# Patient Record
Sex: FEMALE | Race: WHITE | Employment: FULL TIME | URBAN - METROPOLITAN AREA
[De-identification: names, ages, dates, MRNs, and addresses within clinical notes are randomized per-mention and may not be internally consistent; named-entity substitution may affect disease eponyms.]

---

## 2017-01-19 ENCOUNTER — OFFICE VISIT (OUTPATIENT)
Dept: INTERNAL MEDICINE | Age: 42
End: 2017-01-19

## 2017-01-19 VITALS
HEIGHT: 66 IN | DIASTOLIC BLOOD PRESSURE: 68 MMHG | RESPIRATION RATE: 16 BRPM | SYSTOLIC BLOOD PRESSURE: 112 MMHG | BODY MASS INDEX: 24.43 KG/M2 | HEART RATE: 90 BPM | WEIGHT: 152 LBS

## 2017-01-19 DIAGNOSIS — B00.1 HERPES SIMPLEX LABIALIS: Primary | ICD-10-CM

## 2017-01-19 PROCEDURE — 99212 OFFICE O/P EST SF 10 MIN: CPT | Performed by: PHYSICIAN ASSISTANT

## 2017-01-19 RX ORDER — VALACYCLOVIR HYDROCHLORIDE 1 G/1
2000 TABLET, FILM COATED ORAL 2 TIMES DAILY
Qty: 4 TABLET | Refills: 0 | Status: SHIPPED | OUTPATIENT
Start: 2017-01-19 | End: 2018-05-16 | Stop reason: SDUPTHER

## 2017-01-19 RX ORDER — ACYCLOVIR 400 MG/1
400 TABLET ORAL
COMMUNITY
End: 2017-04-13 | Stop reason: SDUPTHER

## 2017-01-19 ASSESSMENT — PATIENT HEALTH QUESTIONNAIRE - PHQ9
2. FEELING DOWN, DEPRESSED OR HOPELESS: 0
SUM OF ALL RESPONSES TO PHQ9 QUESTIONS 1 & 2: 0
SUM OF ALL RESPONSES TO PHQ QUESTIONS 1-9: 0
1. LITTLE INTEREST OR PLEASURE IN DOING THINGS: 0

## 2017-04-13 DIAGNOSIS — B00.1 HERPES SIMPLEX LABIALIS: ICD-10-CM

## 2017-04-13 RX ORDER — ACYCLOVIR 400 MG/1
400 TABLET ORAL
Qty: 25 TABLET | Refills: 3 | Status: SHIPPED | OUTPATIENT
Start: 2017-04-13 | End: 2017-04-18

## 2017-05-25 ENCOUNTER — TELEPHONE (OUTPATIENT)
Dept: INTERNAL MEDICINE | Age: 42
End: 2017-05-25

## 2017-05-25 ENCOUNTER — OFFICE VISIT (OUTPATIENT)
Dept: INTERNAL MEDICINE | Age: 42
End: 2017-05-25

## 2017-05-25 VITALS
SYSTOLIC BLOOD PRESSURE: 112 MMHG | RESPIRATION RATE: 14 BRPM | BODY MASS INDEX: 24.91 KG/M2 | HEART RATE: 88 BPM | WEIGHT: 155 LBS | DIASTOLIC BLOOD PRESSURE: 72 MMHG | HEIGHT: 66 IN

## 2017-05-25 DIAGNOSIS — M72.2 PLANTAR FASCIITIS, BILATERAL: Primary | ICD-10-CM

## 2017-05-25 DIAGNOSIS — M75.01 ADHESIVE CAPSULITIS OF RIGHT SHOULDER: ICD-10-CM

## 2017-05-25 PROCEDURE — 20610 DRAIN/INJ JOINT/BURSA W/O US: CPT | Performed by: PHYSICIAN ASSISTANT

## 2017-05-25 PROCEDURE — 99213 OFFICE O/P EST LOW 20 MIN: CPT | Performed by: PHYSICIAN ASSISTANT

## 2017-05-25 RX ORDER — LEVONORGESTREL AND ETHINYL ESTRADIOL 150-30(84)
KIT ORAL
Refills: 11 | COMMUNITY
Start: 2017-03-10 | End: 2018-02-21 | Stop reason: ALTCHOICE

## 2017-05-25 RX ORDER — TRIAMCINOLONE ACETONIDE 40 MG/ML
40 INJECTION, SUSPENSION INTRA-ARTICULAR; INTRAMUSCULAR ONCE
Status: COMPLETED | OUTPATIENT
Start: 2017-05-25 | End: 2017-05-25

## 2017-05-25 RX ADMIN — TRIAMCINOLONE ACETONIDE 40 MG: 40 INJECTION, SUSPENSION INTRA-ARTICULAR; INTRAMUSCULAR at 08:48

## 2017-05-25 ASSESSMENT — ENCOUNTER SYMPTOMS: BACK PAIN: 0

## 2017-07-27 ENCOUNTER — OFFICE VISIT (OUTPATIENT)
Dept: INTERNAL MEDICINE | Age: 42
End: 2017-07-27

## 2017-07-27 VITALS
WEIGHT: 147 LBS | HEART RATE: 86 BPM | BODY MASS INDEX: 23.63 KG/M2 | SYSTOLIC BLOOD PRESSURE: 104 MMHG | HEIGHT: 66 IN | RESPIRATION RATE: 14 BRPM | DIASTOLIC BLOOD PRESSURE: 68 MMHG

## 2017-07-27 DIAGNOSIS — L23.7 POISON IVY DERMATITIS: Primary | ICD-10-CM

## 2017-07-27 PROCEDURE — 96372 THER/PROPH/DIAG INJ SC/IM: CPT | Performed by: PHYSICIAN ASSISTANT

## 2017-07-27 PROCEDURE — 99212 OFFICE O/P EST SF 10 MIN: CPT | Performed by: PHYSICIAN ASSISTANT

## 2017-07-27 RX ORDER — METHYLPREDNISOLONE ACETATE 80 MG/ML
80 INJECTION, SUSPENSION INTRA-ARTICULAR; INTRALESIONAL; INTRAMUSCULAR; SOFT TISSUE ONCE
Status: COMPLETED | OUTPATIENT
Start: 2017-07-27 | End: 2017-07-27

## 2017-07-27 RX ORDER — METHYLPREDNISOLONE 4 MG/1
TABLET ORAL
Qty: 1 KIT | Refills: 0 | Status: SHIPPED | OUTPATIENT
Start: 2017-07-27 | End: 2017-08-02

## 2017-07-27 RX ADMIN — METHYLPREDNISOLONE ACETATE 80 MG: 80 INJECTION, SUSPENSION INTRA-ARTICULAR; INTRALESIONAL; INTRAMUSCULAR; SOFT TISSUE at 15:55

## 2017-07-27 ASSESSMENT — ENCOUNTER SYMPTOMS
SHORTNESS OF BREATH: 0
SORE THROAT: 0
COUGH: 0

## 2017-08-04 ENCOUNTER — TELEPHONE (OUTPATIENT)
Dept: INTERNAL MEDICINE | Age: 42
End: 2017-08-04

## 2017-08-07 ENCOUNTER — OFFICE VISIT (OUTPATIENT)
Dept: INTERNAL MEDICINE | Age: 42
End: 2017-08-07

## 2017-08-07 VITALS
RESPIRATION RATE: 16 BRPM | DIASTOLIC BLOOD PRESSURE: 78 MMHG | BODY MASS INDEX: 23.95 KG/M2 | HEIGHT: 66 IN | WEIGHT: 149 LBS | HEART RATE: 98 BPM | SYSTOLIC BLOOD PRESSURE: 112 MMHG

## 2017-08-07 DIAGNOSIS — L23.7 POISON IVY DERMATITIS: Primary | ICD-10-CM

## 2017-08-07 PROCEDURE — 99212 OFFICE O/P EST SF 10 MIN: CPT | Performed by: PHYSICIAN ASSISTANT

## 2017-08-07 RX ORDER — CLOBETASOL PROPIONATE 0.5 MG/G
CREAM TOPICAL
Qty: 1 TUBE | Refills: 0 | Status: SHIPPED | OUTPATIENT
Start: 2017-08-07 | End: 2018-02-21 | Stop reason: ALTCHOICE

## 2017-08-16 ENCOUNTER — OFFICE VISIT (OUTPATIENT)
Dept: FAMILY MEDICINE CLINIC | Age: 42
End: 2017-08-16

## 2017-08-16 VITALS
HEIGHT: 64 IN | HEART RATE: 94 BPM | BODY MASS INDEX: 25.61 KG/M2 | WEIGHT: 150 LBS | RESPIRATION RATE: 16 BRPM | SYSTOLIC BLOOD PRESSURE: 110 MMHG | DIASTOLIC BLOOD PRESSURE: 70 MMHG

## 2017-08-16 DIAGNOSIS — M43.6 TORTICOLLIS, ACQUIRED: Primary | ICD-10-CM

## 2017-08-16 DIAGNOSIS — R10.13 EPIGASTRIC PAIN: ICD-10-CM

## 2017-08-16 PROCEDURE — 99213 OFFICE O/P EST LOW 20 MIN: CPT | Performed by: PHYSICIAN ASSISTANT

## 2017-08-16 RX ORDER — OMEPRAZOLE 20 MG/1
20 CAPSULE, DELAYED RELEASE ORAL 2 TIMES DAILY
Qty: 30 CAPSULE | Refills: 3 | Status: SHIPPED | OUTPATIENT
Start: 2017-08-16 | End: 2017-08-22

## 2017-08-16 RX ORDER — NAPROXEN 500 MG/1
500 TABLET ORAL 2 TIMES DAILY WITH MEALS
Qty: 60 TABLET | Refills: 0 | Status: SHIPPED | OUTPATIENT
Start: 2017-08-16 | End: 2017-08-22

## 2017-08-16 RX ORDER — CYCLOBENZAPRINE HCL 5 MG
5 TABLET ORAL 3 TIMES DAILY PRN
Qty: 30 TABLET | Refills: 0 | Status: SHIPPED | OUTPATIENT
Start: 2017-08-16 | End: 2017-08-22

## 2017-08-22 ENCOUNTER — OFFICE VISIT (OUTPATIENT)
Dept: INTERNAL MEDICINE | Age: 42
End: 2017-08-22

## 2017-08-22 VITALS
HEART RATE: 81 BPM | BODY MASS INDEX: 24.43 KG/M2 | SYSTOLIC BLOOD PRESSURE: 110 MMHG | DIASTOLIC BLOOD PRESSURE: 80 MMHG | WEIGHT: 152 LBS | HEIGHT: 66 IN

## 2017-08-22 DIAGNOSIS — T78.40XA ALLERGIC REACTION, INITIAL ENCOUNTER: Primary | ICD-10-CM

## 2017-08-22 PROCEDURE — 99213 OFFICE O/P EST LOW 20 MIN: CPT | Performed by: FAMILY MEDICINE

## 2017-08-22 PROCEDURE — 96372 THER/PROPH/DIAG INJ SC/IM: CPT | Performed by: FAMILY MEDICINE

## 2017-08-22 RX ORDER — FEXOFENADINE HCL 180 MG/1
180 TABLET ORAL DAILY
Qty: 10 TABLET | Refills: 0 | Status: SHIPPED | OUTPATIENT
Start: 2017-08-22 | End: 2017-09-01

## 2017-08-22 RX ORDER — DIPHENHYDRAMINE HCL 25 MG
25 CAPSULE ORAL NIGHTLY
Qty: 10 CAPSULE | Refills: 0 | Status: SHIPPED | OUTPATIENT
Start: 2017-08-22 | End: 2017-09-01

## 2017-08-22 RX ORDER — PREDNISONE 20 MG/1
TABLET ORAL
Qty: 24 TABLET | Refills: 0 | Status: SHIPPED | OUTPATIENT
Start: 2017-08-22 | End: 2017-10-23 | Stop reason: ALTCHOICE

## 2017-08-22 RX ORDER — METHYLPREDNISOLONE ACETATE 80 MG/ML
80 INJECTION, SUSPENSION INTRA-ARTICULAR; INTRALESIONAL; INTRAMUSCULAR; SOFT TISSUE ONCE
Status: COMPLETED | OUTPATIENT
Start: 2017-08-22 | End: 2017-08-22

## 2017-08-22 RX ADMIN — METHYLPREDNISOLONE ACETATE 80 MG: 80 INJECTION, SUSPENSION INTRA-ARTICULAR; INTRALESIONAL; INTRAMUSCULAR; SOFT TISSUE at 13:39

## 2017-08-28 ENCOUNTER — TELEPHONE (OUTPATIENT)
Dept: INTERNAL MEDICINE | Age: 42
End: 2017-08-28

## 2017-09-11 ENCOUNTER — OFFICE VISIT (OUTPATIENT)
Dept: INTERNAL MEDICINE | Age: 42
End: 2017-09-11

## 2017-09-11 VITALS
RESPIRATION RATE: 14 BRPM | HEART RATE: 98 BPM | SYSTOLIC BLOOD PRESSURE: 110 MMHG | WEIGHT: 147 LBS | HEIGHT: 66 IN | BODY MASS INDEX: 23.63 KG/M2 | DIASTOLIC BLOOD PRESSURE: 72 MMHG

## 2017-09-11 DIAGNOSIS — R10.13 EPIGASTRIC ABDOMINAL PAIN: Primary | ICD-10-CM

## 2017-09-11 DIAGNOSIS — M79.662 BILATERAL CALF PAIN: ICD-10-CM

## 2017-09-11 DIAGNOSIS — M79.661 BILATERAL CALF PAIN: ICD-10-CM

## 2017-09-11 PROCEDURE — 99213 OFFICE O/P EST LOW 20 MIN: CPT | Performed by: PHYSICIAN ASSISTANT

## 2017-09-11 ASSESSMENT — ENCOUNTER SYMPTOMS
BLOOD IN STOOL: 0
ABDOMINAL PAIN: 1
NAUSEA: 1
VOMITING: 1
CONSTIPATION: 0

## 2017-09-30 ENCOUNTER — HOSPITAL ENCOUNTER (OUTPATIENT)
Dept: ULTRASOUND IMAGING | Age: 42
Discharge: HOME | End: 2017-09-30
Payer: COMMERCIAL

## 2017-09-30 DIAGNOSIS — R10.13 EPIGASTRIC ABDOMINAL PAIN: ICD-10-CM

## 2017-09-30 PROCEDURE — 76705 ECHO EXAM OF ABDOMEN: CPT

## 2017-10-23 ENCOUNTER — OFFICE VISIT (OUTPATIENT)
Dept: INTERNAL MEDICINE | Age: 42
End: 2017-10-23

## 2017-10-23 VITALS
OXYGEN SATURATION: 99 % | WEIGHT: 146.2 LBS | BODY MASS INDEX: 23.6 KG/M2 | SYSTOLIC BLOOD PRESSURE: 112 MMHG | DIASTOLIC BLOOD PRESSURE: 70 MMHG | HEART RATE: 77 BPM

## 2017-10-23 DIAGNOSIS — Z23 NEED FOR INFLUENZA VACCINATION: ICD-10-CM

## 2017-10-23 DIAGNOSIS — R10.13 EPIGASTRIC ABDOMINAL PAIN: Primary | ICD-10-CM

## 2017-10-23 PROCEDURE — 90471 IMMUNIZATION ADMIN: CPT | Performed by: PHYSICIAN ASSISTANT

## 2017-10-23 PROCEDURE — 90688 IIV4 VACCINE SPLT 0.5 ML IM: CPT | Performed by: PHYSICIAN ASSISTANT

## 2017-10-23 PROCEDURE — 99213 OFFICE O/P EST LOW 20 MIN: CPT | Performed by: PHYSICIAN ASSISTANT

## 2017-10-23 RX ORDER — NORETHINDRONE-ETHIN. ESTRADIOL 1 MG-35MCG
TABLET ORAL
Refills: 1 | COMMUNITY
Start: 2017-09-06 | End: 2018-02-21 | Stop reason: ALTCHOICE

## 2017-10-23 RX ORDER — PREDNISONE 20 MG/1
TABLET ORAL
Refills: 0 | COMMUNITY
Start: 2017-08-22 | End: 2018-02-21 | Stop reason: ALTCHOICE

## 2017-10-23 ASSESSMENT — ENCOUNTER SYMPTOMS
VOMITING: 1
DIARRHEA: 0
CONSTIPATION: 0
COUGH: 0
ABDOMINAL PAIN: 1
NAUSEA: 1

## 2018-01-12 LAB
CHOLESTEROL, TOTAL: 208 MG/DL
CHOLESTEROL/HDL RATIO: 5.1
GLUCOSE BLD-MCNC: 89 MG/DL
HDLC SERPL-MCNC: 41 MG/DL (ref 35–70)
LDL CHOLESTEROL CALCULATED: 139 MG/DL (ref 0–160)
TRIGL SERPL-MCNC: 152 MG/DL
VLDLC SERPL CALC-MCNC: NORMAL MG/DL

## 2018-01-30 ENCOUNTER — OFFICE VISIT (OUTPATIENT)
Dept: FAMILY MEDICINE CLINIC | Age: 43
End: 2018-01-30
Payer: COMMERCIAL

## 2018-01-30 VITALS
HEART RATE: 94 BPM | HEIGHT: 66 IN | DIASTOLIC BLOOD PRESSURE: 72 MMHG | WEIGHT: 156 LBS | SYSTOLIC BLOOD PRESSURE: 126 MMHG | BODY MASS INDEX: 25.07 KG/M2 | RESPIRATION RATE: 16 BRPM

## 2018-01-30 DIAGNOSIS — Z00.00 ANNUAL PHYSICAL EXAM: Primary | ICD-10-CM

## 2018-01-30 DIAGNOSIS — Z01.89 ROUTINE LAB DRAW: ICD-10-CM

## 2018-01-30 DIAGNOSIS — E78.5 DYSLIPIDEMIA: ICD-10-CM

## 2018-01-30 PROCEDURE — 99396 PREV VISIT EST AGE 40-64: CPT | Performed by: PHYSICIAN ASSISTANT

## 2018-01-30 RX ORDER — ATORVASTATIN CALCIUM 20 MG/1
20 TABLET, FILM COATED ORAL DAILY
Qty: 90 TABLET | Refills: 3 | Status: SHIPPED | OUTPATIENT
Start: 2018-01-30 | End: 2019-01-31 | Stop reason: SDUPTHER

## 2018-01-30 RX ORDER — CYCLOBENZAPRINE HCL 5 MG
5 TABLET ORAL 3 TIMES DAILY PRN
COMMUNITY
End: 2018-02-21 | Stop reason: SDUPTHER

## 2018-01-30 RX ORDER — NAPROXEN 500 MG/1
500 TABLET ORAL 2 TIMES DAILY WITH MEALS
COMMUNITY
End: 2020-01-28 | Stop reason: ALTCHOICE

## 2018-01-30 ASSESSMENT — ENCOUNTER SYMPTOMS
SHORTNESS OF BREATH: 0
ABDOMINAL PAIN: 0
COUGH: 0

## 2018-01-30 NOTE — PROGRESS NOTES
SUBJECTIVE  Lupe Velasquez, 43 y.o. female presents today with:  Chief Complaint   Patient presents with   [address]     Had health screening at work, Cholesterol was elevated      PCP:  ARIEL Morales      HPI    Lupe Velasquez is here for annual wellness visit, for insurance,  She does not have any complaints today  She does not exercise regularly  She does not watch the diet. Recent labs were reviewed  Recent labs at work showed dyslipidemia. And she needs them controlled for insurance   Patient's last menstrual period was 11/29/2017. takes oral birth control in order to have no little or no periods      History reviewed. No pertinent past medical history. Past Surgical History:   Procedure Laterality Date    TUBAL LIGATION  2005    UPPER GASTROINTESTINAL ENDOSCOPY  12/01/2017    DR SINGH     Social History     Social History    Marital status:      Spouse name: N/A    Number of children: N/A    Years of education: N/A     Occupational History    Not on file. Social History Main Topics    Smoking status: Never Smoker    Smokeless tobacco: Never Used    Alcohol use No    Drug use: No    Sexual activity: Not on file     Other Topics Concern    Not on file     Social History Narrative    No narrative on file     Family History   Problem Relation Age of Onset    High Cholesterol Mother     High Blood Pressure Mother     Diabetes Maternal Grandmother     Heart Disease Maternal Grandmother     Heart Disease Maternal Grandfather        Review of Systems   Constitutional: Negative for chills and fever. HENT: Negative for congestion. Respiratory: Negative for cough and shortness of breath. Cardiovascular: Negative for chest pain, palpitations and leg swelling. Gastrointestinal: Negative for abdominal pain. Genitourinary: Negative for dysuria. I have reviewed the patient's medical history in detail and updated the computerized patient record.       OBJECTIVE    Vitals: 01/30/18 0804   BP: 126/72   Site: Right Arm   Position: Sitting   Cuff Size: Medium Adult   Pulse: 94   Resp: 16   Weight: 156 lb (70.8 kg)   Height: 5' 6\" (1.676 m)       Physical Exam   Constitutional: She is well-developed, well-nourished, and in no distress. HENT:   Head: Normocephalic. Mouth/Throat: Oropharynx is clear and moist.   Eyes: Conjunctivae are normal.   Neck: Normal range of motion. Neck supple. Cardiovascular: Normal rate, regular rhythm and normal heart sounds. Pulmonary/Chest: Effort normal and breath sounds normal.   Lymphadenopathy:     She has no cervical adenopathy. ASSESSMENT/ PLAN    1. Annual physical exam  - discussed health maintenence  - pt states she has appt for PAP in Feb 2. Dyslipidemia  -  Patient was advised to Start  Lipitor   - The following changes are planned for the next 6 months, at which time the patient will return for repeat fasting lipids:  Dietary changes: Reduce saturated fat, \"trans\" monounsaturated fatty acids, and  cholesterol. Increase soluble fiber. Exercise changes:  Advised to engage in walking   3-4 times a week. - atorvastatin (LIPITOR) 20 MG tablet; Take 1 tablet by mouth daily  Dispense: 90 tablet; Refill: 3    3.  Routine lab draw  - Lipid Panel  - Glucose                Electronically signed by:  ARIEL Melchor   1/30/18

## 2018-02-21 ENCOUNTER — HOSPITAL ENCOUNTER (OUTPATIENT)
Age: 43
Discharge: HOME | End: 2018-02-23
Payer: COMMERCIAL

## 2018-02-21 ENCOUNTER — OFFICE VISIT (OUTPATIENT)
Dept: FAMILY MEDICINE CLINIC | Age: 43
End: 2018-02-21
Payer: COMMERCIAL

## 2018-02-21 ENCOUNTER — HOSPITAL ENCOUNTER (OUTPATIENT)
Dept: GENERAL RADIOLOGY | Age: 43
Discharge: HOME | End: 2018-02-23
Payer: COMMERCIAL

## 2018-02-21 VITALS
BODY MASS INDEX: 25.47 KG/M2 | OXYGEN SATURATION: 98 % | SYSTOLIC BLOOD PRESSURE: 116 MMHG | TEMPERATURE: 98.1 F | DIASTOLIC BLOOD PRESSURE: 72 MMHG | WEIGHT: 157.8 LBS | HEART RATE: 87 BPM

## 2018-02-21 DIAGNOSIS — M54.50 CHRONIC MIDLINE LOW BACK PAIN WITHOUT SCIATICA: ICD-10-CM

## 2018-02-21 DIAGNOSIS — G89.29 CHRONIC MIDLINE LOW BACK PAIN WITHOUT SCIATICA: Primary | ICD-10-CM

## 2018-02-21 DIAGNOSIS — M54.50 CHRONIC MIDLINE LOW BACK PAIN WITHOUT SCIATICA: Primary | ICD-10-CM

## 2018-02-21 DIAGNOSIS — G89.29 CHRONIC MIDLINE LOW BACK PAIN WITHOUT SCIATICA: ICD-10-CM

## 2018-02-21 DIAGNOSIS — M54.2 CERVICALGIA: ICD-10-CM

## 2018-02-21 PROCEDURE — 72170 X-RAY EXAM OF PELVIS: CPT

## 2018-02-21 PROCEDURE — 99213 OFFICE O/P EST LOW 20 MIN: CPT | Performed by: PHYSICIAN ASSISTANT

## 2018-02-21 PROCEDURE — 72040 X-RAY EXAM NECK SPINE 2-3 VW: CPT

## 2018-02-21 RX ORDER — CYCLOBENZAPRINE HCL 10 MG
10 TABLET ORAL 3 TIMES DAILY PRN
Qty: 30 TABLET | Refills: 0 | Status: SHIPPED | OUTPATIENT
Start: 2018-02-21 | End: 2018-03-03

## 2018-02-21 RX ORDER — METHYLPREDNISOLONE 4 MG/1
TABLET ORAL
Qty: 1 KIT | Refills: 0 | Status: SHIPPED | OUTPATIENT
Start: 2018-02-21 | End: 2018-02-27

## 2018-02-21 ASSESSMENT — PATIENT HEALTH QUESTIONNAIRE - PHQ9
SUM OF ALL RESPONSES TO PHQ QUESTIONS 1-9: 0
2. FEELING DOWN, DEPRESSED OR HOPELESS: 0
SUM OF ALL RESPONSES TO PHQ9 QUESTIONS 1 & 2: 0
1. LITTLE INTEREST OR PLEASURE IN DOING THINGS: 0

## 2018-02-21 ASSESSMENT — ENCOUNTER SYMPTOMS: COLOR CHANGE: 0

## 2018-02-21 NOTE — PROGRESS NOTES
(1-2 VIEWS); Future  - cyclobenzaprine (FLEXERIL) 10 MG tablet; Take 1 tablet by mouth 3 times daily as needed for Muscle spasms  Dispense: 30 tablet; Refill: 0  - methylPREDNISolone (MEDROL DOSEPACK) 4 MG tablet; Take by mouth. Dispense: 1 kit; Refill: 0    2. Cervicalgia  - XR CERVICAL SPINE (2-3 VIEWS); Future  - cyclobenzaprine (FLEXERIL) 10 MG tablet; Take 1 tablet by mouth 3 times daily as needed for Muscle spasms  Dispense: 30 tablet; Refill: 0  - methylPREDNISolone (MEDROL DOSEPACK) 4 MG tablet; Take by mouth. Dispense: 1 kit;  Refill: 0                Electronically signed by:  ARIEL Fletcher   2/21/18

## 2018-02-22 DIAGNOSIS — M25.859 FEMORAL ACETABULAR IMPINGEMENT: Primary | ICD-10-CM

## 2018-03-13 ENCOUNTER — HOSPITAL ENCOUNTER (OUTPATIENT)
Dept: ORTHOPEDIC SURGERY | Age: 43
Discharge: HOME | End: 2018-03-15
Payer: COMMERCIAL

## 2018-03-13 DIAGNOSIS — M25.561 ARTHRALGIA OF BOTH LOWER LEGS: ICD-10-CM

## 2018-03-13 DIAGNOSIS — M25.562 ARTHRALGIA OF BOTH LOWER LEGS: ICD-10-CM

## 2018-03-13 PROCEDURE — 73521 X-RAY EXAM HIPS BI 2 VIEWS: CPT

## 2018-05-07 ENCOUNTER — OFFICE VISIT (OUTPATIENT)
Dept: INTERNAL MEDICINE | Age: 43
End: 2018-05-07
Payer: COMMERCIAL

## 2018-05-07 VITALS
HEIGHT: 66 IN | DIASTOLIC BLOOD PRESSURE: 80 MMHG | HEART RATE: 90 BPM | WEIGHT: 158 LBS | BODY MASS INDEX: 25.39 KG/M2 | SYSTOLIC BLOOD PRESSURE: 120 MMHG

## 2018-05-07 DIAGNOSIS — T75.3XXA MOTION SICKNESS, INITIAL ENCOUNTER: Primary | ICD-10-CM

## 2018-05-07 PROCEDURE — 99212 OFFICE O/P EST SF 10 MIN: CPT | Performed by: PHYSICIAN ASSISTANT

## 2018-05-07 RX ORDER — SCOLOPAMINE TRANSDERMAL SYSTEM 1 MG/1
1 PATCH, EXTENDED RELEASE TRANSDERMAL
Qty: 4 PATCH | Refills: 0 | Status: SHIPPED | OUTPATIENT
Start: 2018-05-07 | End: 2019-05-07

## 2018-05-07 ASSESSMENT — ENCOUNTER SYMPTOMS
SHORTNESS OF BREATH: 0
COUGH: 0
ABDOMINAL PAIN: 0

## 2018-05-16 ENCOUNTER — TELEPHONE (OUTPATIENT)
Dept: INTERNAL MEDICINE | Age: 43
End: 2018-05-16

## 2018-05-16 DIAGNOSIS — B00.1 HERPES SIMPLEX LABIALIS: ICD-10-CM

## 2018-05-16 RX ORDER — VALACYCLOVIR HYDROCHLORIDE 1 G/1
2000 TABLET, FILM COATED ORAL 2 TIMES DAILY
Qty: 4 TABLET | Refills: 0 | Status: SHIPPED | OUTPATIENT
Start: 2018-05-16 | End: 2019-09-09 | Stop reason: SDUPTHER

## 2018-06-04 ENCOUNTER — OFFICE VISIT (OUTPATIENT)
Dept: INTERNAL MEDICINE | Age: 43
End: 2018-06-04
Payer: COMMERCIAL

## 2018-06-04 ENCOUNTER — TELEPHONE (OUTPATIENT)
Dept: INTERNAL MEDICINE | Age: 43
End: 2018-06-04

## 2018-06-04 VITALS
HEART RATE: 90 BPM | BODY MASS INDEX: 24.78 KG/M2 | DIASTOLIC BLOOD PRESSURE: 70 MMHG | OXYGEN SATURATION: 98 % | WEIGHT: 153.5 LBS | SYSTOLIC BLOOD PRESSURE: 130 MMHG

## 2018-06-04 DIAGNOSIS — L23.7 POISON IVY DERMATITIS: Primary | ICD-10-CM

## 2018-06-04 PROCEDURE — 99213 OFFICE O/P EST LOW 20 MIN: CPT | Performed by: PHYSICIAN ASSISTANT

## 2018-06-04 PROCEDURE — 96372 THER/PROPH/DIAG INJ SC/IM: CPT | Performed by: PHYSICIAN ASSISTANT

## 2018-06-04 RX ORDER — TRIAMCINOLONE ACETONIDE 0.25 MG/G
OINTMENT TOPICAL
Qty: 1 TUBE | Refills: 0 | Status: SHIPPED | OUTPATIENT
Start: 2018-06-04 | End: 2018-06-11

## 2018-06-04 RX ORDER — METHYLPREDNISOLONE ACETATE 80 MG/ML
80 INJECTION, SUSPENSION INTRA-ARTICULAR; INTRALESIONAL; INTRAMUSCULAR; SOFT TISSUE ONCE
Status: COMPLETED | OUTPATIENT
Start: 2018-06-04 | End: 2018-06-04

## 2018-06-04 RX ORDER — METHYLPREDNISOLONE 4 MG/1
TABLET ORAL
Qty: 1 KIT | Refills: 0 | Status: SHIPPED | OUTPATIENT
Start: 2018-06-04 | End: 2018-06-10

## 2018-06-04 RX ORDER — CLOBETASOL PROPIONATE 0.5 MG/G
CREAM TOPICAL
Qty: 1 TUBE | Refills: 0 | Status: SHIPPED | OUTPATIENT
Start: 2018-06-04 | End: 2018-10-22

## 2018-06-04 RX ADMIN — METHYLPREDNISOLONE ACETATE 80 MG: 80 INJECTION, SUSPENSION INTRA-ARTICULAR; INTRALESIONAL; INTRAMUSCULAR; SOFT TISSUE at 08:56

## 2018-06-04 ASSESSMENT — ENCOUNTER SYMPTOMS
COUGH: 0
SHORTNESS OF BREATH: 0

## 2018-10-22 ENCOUNTER — OFFICE VISIT (OUTPATIENT)
Dept: INTERNAL MEDICINE | Age: 43
End: 2018-10-22
Payer: COMMERCIAL

## 2018-10-22 ENCOUNTER — HOSPITAL ENCOUNTER (OUTPATIENT)
Age: 43
Discharge: HOME | End: 2018-10-24
Payer: COMMERCIAL

## 2018-10-22 ENCOUNTER — HOSPITAL ENCOUNTER (OUTPATIENT)
Dept: GENERAL RADIOLOGY | Age: 43
Discharge: HOME | End: 2018-10-24
Payer: COMMERCIAL

## 2018-10-22 VITALS
DIASTOLIC BLOOD PRESSURE: 80 MMHG | SYSTOLIC BLOOD PRESSURE: 122 MMHG | WEIGHT: 160 LBS | TEMPERATURE: 98.4 F | HEIGHT: 66 IN | HEART RATE: 86 BPM | BODY MASS INDEX: 25.71 KG/M2 | OXYGEN SATURATION: 98 %

## 2018-10-22 DIAGNOSIS — Z23 NEED FOR INFLUENZA VACCINATION: ICD-10-CM

## 2018-10-22 DIAGNOSIS — M77.8 LEFT ELBOW TENDONITIS: Primary | ICD-10-CM

## 2018-10-22 DIAGNOSIS — M77.8 LEFT ELBOW TENDONITIS: ICD-10-CM

## 2018-10-22 PROCEDURE — 73080 X-RAY EXAM OF ELBOW: CPT

## 2018-10-22 PROCEDURE — 99214 OFFICE O/P EST MOD 30 MIN: CPT | Performed by: PHYSICIAN ASSISTANT

## 2018-10-22 PROCEDURE — 90471 IMMUNIZATION ADMIN: CPT | Performed by: PHYSICIAN ASSISTANT

## 2018-10-22 PROCEDURE — 90688 IIV4 VACCINE SPLT 0.5 ML IM: CPT | Performed by: PHYSICIAN ASSISTANT

## 2018-10-28 ASSESSMENT — ENCOUNTER SYMPTOMS: BACK PAIN: 0

## 2019-01-11 LAB
CHOLESTEROL, TOTAL: NORMAL MG/DL
CHOLESTEROL/HDL RATIO: NORMAL
GLUCOSE BLD-MCNC: 88 MG/DL
HDLC SERPL-MCNC: 47 MG/DL (ref 35–70)
HDLC SERPL-MCNC: 47 MG/DL (ref 35–70)
LDL CHOLESTEROL CALCULATED: NORMAL MG/DL (ref 0–160)
TRIGL SERPL-MCNC: 90 MG/DL
VLDLC SERPL CALC-MCNC: NORMAL MG/DL

## 2019-01-31 ENCOUNTER — OFFICE VISIT (OUTPATIENT)
Dept: INTERNAL MEDICINE | Age: 44
End: 2019-01-31
Payer: COMMERCIAL

## 2019-01-31 VITALS
SYSTOLIC BLOOD PRESSURE: 124 MMHG | DIASTOLIC BLOOD PRESSURE: 84 MMHG | BODY MASS INDEX: 27.61 KG/M2 | HEART RATE: 71 BPM | OXYGEN SATURATION: 99 % | TEMPERATURE: 97.8 F | WEIGHT: 171.8 LBS | HEIGHT: 66 IN

## 2019-01-31 DIAGNOSIS — Z00.00 ANNUAL PHYSICAL EXAM: Primary | ICD-10-CM

## 2019-01-31 DIAGNOSIS — Z00.00 ANNUAL PHYSICAL EXAM: ICD-10-CM

## 2019-01-31 DIAGNOSIS — E78.5 DYSLIPIDEMIA: ICD-10-CM

## 2019-01-31 DIAGNOSIS — E78.5 DYSLIPIDEMIA (HIGH LDL; LOW HDL): ICD-10-CM

## 2019-01-31 LAB
ALBUMIN SERPL-MCNC: 3.7 G/DL (ref 3.9–4.9)
ALP BLD-CCNC: 70 U/L (ref 40–130)
ALT SERPL-CCNC: 16 U/L (ref 0–33)
ANION GAP SERPL CALCULATED.3IONS-SCNC: 12 MEQ/L (ref 7–13)
AST SERPL-CCNC: 15 U/L (ref 0–35)
BASOPHILS ABSOLUTE: 0.1 K/UL (ref 0–0.2)
BASOPHILS RELATIVE PERCENT: 0.8 %
BILIRUB SERPL-MCNC: 0.3 MG/DL (ref 0–1.2)
BUN BLDV-MCNC: 13 MG/DL (ref 6–20)
CALCIUM SERPL-MCNC: 8.8 MG/DL (ref 8.6–10.2)
CHLORIDE BLD-SCNC: 105 MEQ/L (ref 98–107)
CO2: 25 MEQ/L (ref 22–29)
CREAT SERPL-MCNC: 0.64 MG/DL (ref 0.5–0.9)
EOSINOPHILS ABSOLUTE: 0.2 K/UL (ref 0–0.7)
EOSINOPHILS RELATIVE PERCENT: 2.5 %
GFR AFRICAN AMERICAN: >60
GFR NON-AFRICAN AMERICAN: >60
GLOBULIN: 2.8 G/DL (ref 2.3–3.5)
GLUCOSE BLD-MCNC: 102 MG/DL (ref 74–109)
HCT VFR BLD CALC: 37.6 % (ref 37–47)
HEMOGLOBIN: 13.3 G/DL (ref 12–16)
LYMPHOCYTES ABSOLUTE: 2.3 K/UL (ref 1–4.8)
LYMPHOCYTES RELATIVE PERCENT: 33.7 %
MCH RBC QN AUTO: 32.1 PG (ref 27–31.3)
MCHC RBC AUTO-ENTMCNC: 35.5 % (ref 33–37)
MCV RBC AUTO: 90.5 FL (ref 82–100)
MONOCYTES ABSOLUTE: 0.6 K/UL (ref 0.2–0.8)
MONOCYTES RELATIVE PERCENT: 8.5 %
NEUTROPHILS ABSOLUTE: 3.8 K/UL (ref 1.4–6.5)
NEUTROPHILS RELATIVE PERCENT: 54.5 %
PDW BLD-RTO: 12.4 % (ref 11.5–14.5)
PLATELET # BLD: 287 K/UL (ref 130–400)
POTASSIUM SERPL-SCNC: 3.9 MEQ/L (ref 3.5–5.1)
RBC # BLD: 4.15 M/UL (ref 4.2–5.4)
SODIUM BLD-SCNC: 142 MEQ/L (ref 132–144)
TOTAL PROTEIN: 6.5 G/DL (ref 6.4–8.1)
WBC # BLD: 6.9 K/UL (ref 4.8–10.8)

## 2019-01-31 PROCEDURE — 99396 PREV VISIT EST AGE 40-64: CPT | Performed by: PHYSICIAN ASSISTANT

## 2019-01-31 RX ORDER — ATORVASTATIN CALCIUM 20 MG/1
20 TABLET, FILM COATED ORAL DAILY
Qty: 90 TABLET | Refills: 3 | Status: SHIPPED | OUTPATIENT
Start: 2019-01-31 | End: 2020-01-26 | Stop reason: SDUPTHER

## 2019-01-31 RX ORDER — ATORVASTATIN CALCIUM 20 MG/1
20 TABLET, FILM COATED ORAL DAILY
Qty: 90 TABLET | Refills: 3 | Status: CANCELLED | OUTPATIENT
Start: 2019-01-31

## 2019-01-31 ASSESSMENT — ENCOUNTER SYMPTOMS
BACK PAIN: 0
SHORTNESS OF BREATH: 0
ABDOMINAL PAIN: 0

## 2019-02-02 VITALS — SYSTOLIC BLOOD PRESSURE: 110 MMHG | DIASTOLIC BLOOD PRESSURE: 62 MMHG

## 2019-03-26 ENCOUNTER — OFFICE VISIT (OUTPATIENT)
Dept: INTERNAL MEDICINE | Age: 44
End: 2019-03-26
Payer: COMMERCIAL

## 2019-03-26 VITALS
HEIGHT: 66 IN | OXYGEN SATURATION: 98 % | BODY MASS INDEX: 26.84 KG/M2 | WEIGHT: 167 LBS | SYSTOLIC BLOOD PRESSURE: 132 MMHG | DIASTOLIC BLOOD PRESSURE: 88 MMHG | TEMPERATURE: 98.3 F | HEART RATE: 83 BPM

## 2019-03-26 DIAGNOSIS — N93.8 DYSFUNCTIONAL UTERINE BLEEDING: ICD-10-CM

## 2019-03-26 DIAGNOSIS — Z12.4 SCREENING FOR CERVICAL CANCER: ICD-10-CM

## 2019-03-26 DIAGNOSIS — Z01.419 PAP SMEAR, AS PART OF ROUTINE GYNECOLOGICAL EXAMINATION: Primary | ICD-10-CM

## 2019-03-26 DIAGNOSIS — Z01.419 PAP SMEAR, AS PART OF ROUTINE GYNECOLOGICAL EXAMINATION: ICD-10-CM

## 2019-03-26 DIAGNOSIS — N92.1 MENORRHAGIA WITH IRREGULAR CYCLE: ICD-10-CM

## 2019-03-26 PROCEDURE — 99396 PREV VISIT EST AGE 40-64: CPT | Performed by: PHYSICIAN ASSISTANT

## 2019-03-26 ASSESSMENT — PATIENT HEALTH QUESTIONNAIRE - PHQ9
2. FEELING DOWN, DEPRESSED OR HOPELESS: 0
1. LITTLE INTEREST OR PLEASURE IN DOING THINGS: 0
SUM OF ALL RESPONSES TO PHQ QUESTIONS 1-9: 0
SUM OF ALL RESPONSES TO PHQ QUESTIONS 1-9: 0
SUM OF ALL RESPONSES TO PHQ9 QUESTIONS 1 & 2: 0

## 2019-03-26 ASSESSMENT — ENCOUNTER SYMPTOMS
BACK PAIN: 0
SHORTNESS OF BREATH: 0
COUGH: 0

## 2019-04-01 ENCOUNTER — HOSPITAL ENCOUNTER (OUTPATIENT)
Dept: ULTRASOUND IMAGING | Age: 44
Discharge: HOME | End: 2019-04-03
Payer: COMMERCIAL

## 2019-04-01 DIAGNOSIS — N93.8 DYSFUNCTIONAL UTERINE BLEEDING: ICD-10-CM

## 2019-04-01 DIAGNOSIS — N92.1 MENORRHAGIA WITH IRREGULAR CYCLE: ICD-10-CM

## 2019-04-01 LAB
HPV COMMENT: ABNORMAL
HPV TYPE 16: NOT DETECTED
HPV TYPE 18: NOT DETECTED
HPVOH (OTHER TYPES): DETECTED

## 2019-04-01 PROCEDURE — 76856 US EXAM PELVIC COMPLETE: CPT

## 2019-04-01 PROCEDURE — 76830 TRANSVAGINAL US NON-OB: CPT

## 2019-09-09 DIAGNOSIS — B00.1 HERPES SIMPLEX LABIALIS: ICD-10-CM

## 2019-09-09 RX ORDER — VALACYCLOVIR HYDROCHLORIDE 1 G/1
2000 TABLET, FILM COATED ORAL 2 TIMES DAILY
Qty: 4 TABLET | Refills: 0 | Status: SHIPPED | OUTPATIENT
Start: 2019-09-09 | End: 2020-06-01 | Stop reason: SDUPTHER

## 2020-01-08 LAB
AVERAGE GLUCOSE: 81
CHOLESTEROL, TOTAL: 148 MG/DL
CHOLESTEROL/HDL RATIO: NORMAL
HBA1C MFR BLD: 5.3 %
HDLC SERPL-MCNC: 58 MG/DL (ref 35–70)
LDL CHOLESTEROL CALCULATED: 67 MG/DL (ref 0–160)
TRIGL SERPL-MCNC: 156 MG/DL
VLDLC SERPL CALC-MCNC: NORMAL MG/DL

## 2020-01-28 ENCOUNTER — OFFICE VISIT (OUTPATIENT)
Dept: INTERNAL MEDICINE | Age: 45
End: 2020-01-28
Payer: COMMERCIAL

## 2020-01-28 VITALS
SYSTOLIC BLOOD PRESSURE: 122 MMHG | OXYGEN SATURATION: 99 % | WEIGHT: 165 LBS | BODY MASS INDEX: 26.52 KG/M2 | HEIGHT: 66 IN | HEART RATE: 80 BPM | DIASTOLIC BLOOD PRESSURE: 84 MMHG | TEMPERATURE: 98.1 F

## 2020-01-28 PROCEDURE — 99396 PREV VISIT EST AGE 40-64: CPT | Performed by: PHYSICIAN ASSISTANT

## 2020-01-28 PROCEDURE — 90471 IMMUNIZATION ADMIN: CPT | Performed by: PHYSICIAN ASSISTANT

## 2020-01-28 PROCEDURE — 90686 IIV4 VACC NO PRSV 0.5 ML IM: CPT | Performed by: PHYSICIAN ASSISTANT

## 2020-01-28 RX ORDER — ATORVASTATIN CALCIUM 20 MG/1
20 TABLET, FILM COATED ORAL DAILY
Qty: 90 TABLET | Refills: 3 | Status: SHIPPED | OUTPATIENT
Start: 2020-01-28 | End: 2021-08-26

## 2020-01-28 RX ORDER — ATORVASTATIN CALCIUM 20 MG/1
20 TABLET, FILM COATED ORAL DAILY
Qty: 90 TABLET | Refills: 3 | Status: CANCELLED | OUTPATIENT
Start: 2020-01-28

## 2020-01-28 ASSESSMENT — ENCOUNTER SYMPTOMS
CHEST TIGHTNESS: 0
BLOOD IN STOOL: 0
SHORTNESS OF BREATH: 0
CONSTIPATION: 0
ABDOMINAL PAIN: 0
DIARRHEA: 0
COUGH: 0

## 2020-01-28 ASSESSMENT — PATIENT HEALTH QUESTIONNAIRE - PHQ9
SUM OF ALL RESPONSES TO PHQ9 QUESTIONS 1 & 2: 0
SUM OF ALL RESPONSES TO PHQ QUESTIONS 1-9: 0
2. FEELING DOWN, DEPRESSED OR HOPELESS: 0
1. LITTLE INTEREST OR PLEASURE IN DOING THINGS: 0
SUM OF ALL RESPONSES TO PHQ QUESTIONS 1-9: 0

## 2020-01-28 NOTE — PROGRESS NOTES
education level: Not on file   Occupational History    Not on file   Social Needs    Financial resource strain: Not on file    Food insecurity:     Worry: Not on file     Inability: Not on file    Transportation needs:     Medical: Not on file     Non-medical: Not on file   Tobacco Use    Smoking status: Never Smoker    Smokeless tobacco: Never Used   Substance and Sexual Activity    Alcohol use: No     Alcohol/week: 0.0 standard drinks    Drug use: No    Sexual activity: Not on file   Lifestyle    Physical activity:     Days per week: Not on file     Minutes per session: Not on file    Stress: Not on file   Relationships    Social connections:     Talks on phone: Not on file     Gets together: Not on file     Attends Baptism service: Not on file     Active member of club or organization: Not on file     Attends meetings of clubs or organizations: Not on file     Relationship status: Not on file    Intimate partner violence:     Fear of current or ex partner: Not on file     Emotionally abused: Not on file     Physically abused: Not on file     Forced sexual activity: Not on file   Other Topics Concern    Not on file   Social History Narrative    Not on file        Family History   Problem Relation Age of Onset    High Cholesterol Mother     High Blood Pressure Mother     Diabetes Maternal Grandmother     Heart Disease Maternal Grandmother     Heart Disease Maternal Grandfather        ADVANCE DIRECTIVE: N, Not Received    Vitals:    01/28/20 0802   BP: 122/84   Site: Right Upper Arm   Position: Sitting   Cuff Size: Large Adult   Pulse: 80   Temp: 98.1 °F (36.7 °C)   TempSrc: Temporal   SpO2: 99%   Weight: 165 lb (74.8 kg)   Height: 5' 6\" (1.676 m)     Estimated body mass index is 26.63 kg/m² as calculated from the following:    Height as of this encounter: 5' 6\" (1.676 m). Weight as of this encounter: 165 lb (74.8 kg). Physical Exam  Vitals signs reviewed.    Constitutional: Health Maintenance   Topic Date Due    DTaP/Tdap/Td vaccine (1 - Tdap) 12/07/1986    Flu vaccine (1) 09/01/2019    Lipid screen  01/08/2021    Diabetes screen  01/08/2023    Cervical cancer screen  03/26/2024    HIV screen  Completed    Pneumococcal 0-64 years Vaccine  Aged Out       ASSESSMENT/PLAN:  1. Annual physical exam    2. Dyslipidemia (high LDL; low HDL)  - on statin, controlled     3. Need for influenza vaccination  - INFLUENZA, QUADV, 3 YRS AND OLDER, IM PF, PREFILL SYR OR SDV, 0.5ML (AFLURIA QUADV, PF)      No follow-ups on file. An electronic signature was used to authenticate this note.     --ARIEL Khan on 1/28/2020 at 8:34 AM

## 2020-05-07 ENCOUNTER — TELEMEDICINE (OUTPATIENT)
Dept: INTERNAL MEDICINE | Age: 45
End: 2020-05-07
Payer: COMMERCIAL

## 2020-05-07 ENCOUNTER — NURSE TRIAGE (OUTPATIENT)
Dept: OTHER | Facility: CLINIC | Age: 45
End: 2020-05-07

## 2020-05-07 PROCEDURE — 99213 OFFICE O/P EST LOW 20 MIN: CPT | Performed by: PHYSICIAN ASSISTANT

## 2020-05-07 ASSESSMENT — ENCOUNTER SYMPTOMS
SORE THROAT: 0
SINUS PRESSURE: 0
RHINORRHEA: 1
WHEEZING: 0
CONSTIPATION: 0
SHORTNESS OF BREATH: 0
COUGH: 0
ABDOMINAL PAIN: 0
SINUS PAIN: 0
DIARRHEA: 0
NAUSEA: 0
VOMITING: 0

## 2020-05-07 NOTE — TELEPHONE ENCOUNTER
Reason for Disposition   Fever present > 3 days (72 hours)    Answer Assessment - Initial Assessment Questions  1. TEMPERATURE: \"What is the most recent temperature? \"  \"How was it measured? \"       Today 98.7, past few days ran   2. ONSET: \"When did the fever start? \"   5-4-2020  3. SYMPTOMS: \"Do you have any other symptoms besides the fever? \"  (e.g., colds, headache, sore throat, earache, cough, rash, diarrhea, vomiting, abdominal pain)     Stuff nose, headache   4. CAUSE: If there are no symptoms, ask: \"What do you think is causing the fever? \"       Unsure   5. CONTACTS: \"Does anyone else in the family have an infection? \"  no  6. TREATMENT: \"What have you done so far to treat this fever? \" (e.g., medications)     Tylenol, mucenx   7. IMMUNOCOMPROMISE: \"Do you have of the following: diabetes, HIV positive, splenectomy, cancer chemotherapy, chronic steroid treatment, transplant patient, etc.\"   no  8. PREGNANCY: \"Is there any chance you are pregnant? \" \"When was your last menstrual period? \"     no  9. TRAVEL: \"Have you traveled out of the country in the last month? \" (e.g., travel history, exposures)     no    Protocols used: FEVER-ADULT-[place]    Pt with fever for 4 days, no fever right now. Pt states it comes and goes. Disposition to see PCP today or tomorrow. Warm transfer to [facility].

## 2020-05-07 NOTE — PROGRESS NOTES
2020    TELEHEALTH EVALUATION -- Audio/Visual (During [id number] public health emergency)    Due to COVID 19 outbreak, patient's office visit was converted to a virtual visit. Patient was contacted and agreed to proceed with a virtual visit via [employer] me  The risks and benefits of converting to a virtual visit were discussed in light of the current infectious disease epidemic. Patient also understood that insurance coverage and co-pays are up to their individual insurance plans. HPI:    Rd Chandra (:  1975) has requested an audio/video evaluation for the following concern(s):    Chief Complaint   Patient presents with    Fever     x 1 week, headache, nasal congestion, no ST, no vomiting, feels like a cold, fever went as high as 101 F, today 97.6 at 11am with no medication       Fever, with Ha and congestion x 1 week   States highest temp 101 degrees   No fever today or yesterday   No recent travel, no exposure to positive covid-19   No sick family members      Review of Systems   Constitutional: Positive for fever. Negative for activity change, appetite change, chills, fatigue and unexpected weight change. HENT: Positive for congestion and rhinorrhea. Negative for postnasal drip, sinus pressure, sinus pain, sneezing and sore throat. Respiratory: Negative for cough, shortness of breath and wheezing. Gastrointestinal: Negative for abdominal pain, constipation, diarrhea, nausea and vomiting. Prior to Visit Medications    Medication Sig Taking? Authorizing Provider   atorvastatin (LIPITOR) 20 MG tablet Take 1 tablet by mouth daily Yes ARIEL Avila   norethindrone-ethinyl estradiol (ORTHO-NOVUM 1-35 TAB;NORTREL 1-35 TAB) 1-35 MG-MCG per tablet 1 tablet by mouth daily to be taken continuously. New pack every 21 days.  Yes Historical Provider, MD   ibuprofen (ADVIL;MOTRIN) 600 MG tablet Take 1 tablet by mouth every 6 hours as needed for Pain Yes ARIEL Avila       Social

## 2020-05-31 ENCOUNTER — PATIENT MESSAGE (OUTPATIENT)
Dept: INTERNAL MEDICINE | Age: 45
End: 2020-05-31

## 2020-06-01 RX ORDER — VALACYCLOVIR HYDROCHLORIDE 1 G/1
2000 TABLET, FILM COATED ORAL 2 TIMES DAILY
Qty: 4 TABLET | Refills: 0 | Status: SHIPPED | OUTPATIENT
Start: 2020-06-01 | End: 2020-09-22 | Stop reason: SDUPTHER

## 2020-06-01 NOTE — TELEPHONE ENCOUNTER
Requesting medication refill. Please approve or deny this request.    Rx requested:  Requested Prescriptions     Pending Prescriptions Disp Refills    valACYclovir (VALTREX) 1 g tablet 4 tablet 0     Sig: Take 2 tablets by mouth 2 times daily for 1 day TAKE DOSES 12 HOURS APART       Last Office Visit:   5-7-20    Last Filled:  9-9-19  Last Labs:      Next Visit Date:  No future appointments.

## 2020-06-17 ENCOUNTER — PATIENT MESSAGE (OUTPATIENT)
Dept: INTERNAL MEDICINE | Age: 45
End: 2020-06-17

## 2020-08-06 ENCOUNTER — OFFICE VISIT (OUTPATIENT)
Dept: INTERNAL MEDICINE | Age: 45
End: 2020-08-06
Payer: COMMERCIAL

## 2020-08-06 VITALS
RESPIRATION RATE: 20 BRPM | SYSTOLIC BLOOD PRESSURE: 124 MMHG | OXYGEN SATURATION: 98 % | TEMPERATURE: 98.2 F | DIASTOLIC BLOOD PRESSURE: 82 MMHG | HEIGHT: 66 IN | BODY MASS INDEX: 27 KG/M2 | WEIGHT: 168 LBS | HEART RATE: 70 BPM

## 2020-08-06 PROCEDURE — 99213 OFFICE O/P EST LOW 20 MIN: CPT | Performed by: PHYSICIAN ASSISTANT

## 2020-08-06 RX ORDER — CLOBETASOL PROPIONATE 0.5 MG/G
CREAM TOPICAL
Qty: 1 TUBE | Refills: 0 | Status: SHIPPED | OUTPATIENT
Start: 2020-08-06 | End: 2022-07-08 | Stop reason: ALTCHOICE

## 2020-08-06 RX ORDER — METHYLPREDNISOLONE 4 MG/1
TABLET ORAL
Qty: 1 KIT | Refills: 0 | Status: SHIPPED | OUTPATIENT
Start: 2020-08-06 | End: 2020-08-12

## 2020-08-06 ASSESSMENT — ENCOUNTER SYMPTOMS
COUGH: 0
SORE THROAT: 0
SHORTNESS OF BREATH: 0

## 2020-08-06 NOTE — PROGRESS NOTES
2020     Elham Knox (:  1975) is a 40 y.o. female, here for evaluation of the following medical concerns:      Chief Complaint   Patient presents with    Rash     x 4 days on arms and legs         Poison Ivy   This is a new problem. Episode onset: 4 days. The problem has been gradually worsening since onset. Location: bilateral forearms  The rash is characterized by redness and itchiness. She was exposed to plant contact. Pertinent negatives include no cough, shortness of breath or sore throat. Past treatments include anti-itch cream. The treatment provided mild relief. Review of Systems   HENT: Negative for sore throat. Respiratory: Negative for cough and shortness of breath. Skin: Positive for rash. Prior to Visit Medications    Medication Sig Taking? Authorizing Provider   methylPREDNISolone (MEDROL DOSEPACK) 4 MG tablet Take by mouth. Yes ARIEL Díaz   clobetasol (TEMOVATE) 0.05 % cream Apply topically 2 times daily. Yes ARIEL Díaz   atorvastatin (LIPITOR) 20 MG tablet Take 1 tablet by mouth daily Yes ARIEL Barrera   norethindrone-ethinyl estradiol (ORTHO-NOVUM 1-35 TAB;NORTREL 1-35 TAB) 1-35 MG-MCG per tablet 1 tablet by mouth daily to be taken continuously. New pack every 21 days. Yes Historical Provider, MD   ibuprofen (ADVIL;MOTRIN) 600 MG tablet Take 1 tablet by mouth every 6 hours as needed for Pain Yes ARIEL Barrera        Social History     Tobacco Use    Smoking status: Never Smoker    Smokeless tobacco: Never Used   Substance Use Topics    Alcohol use: No     Alcohol/week: 0.0 standard drinks        Vitals:    20 1646   BP: 124/82   Pulse: 70   Resp: 20   Temp: 98.2 °F (36.8 °C)   TempSrc: Tympanic   SpO2: 98%   Weight: 168 lb (76.2 kg)   Height: 5' 6\" (1.676 m)     Estimated body mass index is 27.12 kg/m² as calculated from the following:    Height as of this encounter: 5' 6\" (1.676 m).     Weight as of this encounter: 168 lb (76.2 kg). Physical Exam  Vitals signs reviewed. Skin:     Findings: Rash (on the forearms ) present. Neurological:      Mental Status: She is alert. ASSESSMENT/PLAN:  1. Poison ivy  - Continue OTC creams/sprays/baths PRN itching  - Follow up if no improvement for worsening   - methylPREDNISolone (MEDROL DOSEPACK) 4 MG tablet; Take by mouth. Dispense: 1 kit; Refill: 0  - clobetasol (TEMOVATE) 0.05 % cream; Apply topically 2 times daily. Dispense: 1 Tube; Refill: 0      No follow-ups on file. An electronic signature was used to authenticate this note.     --ARIEL Jefferson on 8/6/2020 at 5:26 PM

## 2020-09-14 RX ORDER — PREDNISONE 10 MG/1
TABLET ORAL
Qty: 21 TABLET | Refills: 0 | Status: SHIPPED | OUTPATIENT
Start: 2020-09-14 | End: 2021-08-26

## 2020-09-21 ENCOUNTER — PATIENT MESSAGE (OUTPATIENT)
Dept: INTERNAL MEDICINE | Age: 45
End: 2020-09-21

## 2020-09-21 NOTE — TELEPHONE ENCOUNTER
From: Ricardo Osborn  To: ARIEL Box  Sent: 9/21/2020 8:02 AM EDT  Subject: Prescription Question    Can you please call in a prescription for cold sores? They started early this year. I already have them. At [facility], if you will call it in.  With refills

## 2020-09-22 RX ORDER — VALACYCLOVIR HYDROCHLORIDE 1 G/1
2000 TABLET, FILM COATED ORAL 2 TIMES DAILY
Qty: 4 TABLET | Refills: 0 | Status: SHIPPED | OUTPATIENT
Start: 2020-09-22 | End: 2020-12-12 | Stop reason: SDUPTHER

## 2020-10-23 ENCOUNTER — NURSE ONLY (OUTPATIENT)
Dept: INTERNAL MEDICINE | Age: 45
End: 2020-10-23
Payer: COMMERCIAL

## 2020-10-23 PROCEDURE — 90471 IMMUNIZATION ADMIN: CPT | Performed by: PHYSICIAN ASSISTANT

## 2020-10-23 PROCEDURE — 90688 IIV4 VACCINE SPLT 0.5 ML IM: CPT | Performed by: PHYSICIAN ASSISTANT

## 2020-10-23 NOTE — PROGRESS NOTES
Vaccine Information Sheet, \"Influenza - Inactivated\" OR \"Live - Intranasal\"  given to Goran Cheng, or parent/legal guardian of  Goran Cheng and verbalized understanding. Patient responses:    Have you ever had a reaction to a flu vaccine? No  Are you able to eat eggs without adverse effects? Yes  Do you have any current illness? No  Have you ever had Guillian Bedford Syndrome? No    Flu vaccine given per order. Please see immunization tab.

## 2020-11-25 ENCOUNTER — TELEMEDICINE (OUTPATIENT)
Dept: INTERNAL MEDICINE | Age: 45
End: 2020-11-25
Payer: COMMERCIAL

## 2020-11-25 ENCOUNTER — NURSE TRIAGE (OUTPATIENT)
Dept: OTHER | Facility: CLINIC | Age: 45
End: 2020-11-25

## 2020-11-25 DIAGNOSIS — Z20.822 COUGH WITH EXPOSURE TO COVID-19 VIRUS: ICD-10-CM

## 2020-11-25 DIAGNOSIS — R05.8 COUGH WITH EXPOSURE TO COVID-19 VIRUS: ICD-10-CM

## 2020-11-25 PROCEDURE — 99213 OFFICE O/P EST LOW 20 MIN: CPT | Performed by: PHYSICIAN ASSISTANT

## 2020-11-25 ASSESSMENT — ENCOUNTER SYMPTOMS
CHEST TIGHTNESS: 0
RHINORRHEA: 1
COUGH: 1
TROUBLE SWALLOWING: 0
WHEEZING: 0
SHORTNESS OF BREATH: 0
SORE THROAT: 0

## 2020-11-25 NOTE — TELEPHONE ENCOUNTER
Reason for Disposition   [1] COVID-19 infection suspected by caller or triager AND [2] mild symptoms (cough, fever, or others) AND [7] no complications or SOB    Answer Assessment - Initial Assessment Questions  1. COVID-19 DIAGNOSIS: \"Who made your Coronavirus (COVID-19) diagnosis? \" \"Was it confirmed by a positive lab test?\" If not diagnosed by a HCP, ask \"Are there lots of cases (community spread) where you live? \" (See public health department website, if unsure)      Has not been diagnosed. 2. COVID-19 EXPOSURE: \"Was there any known exposure to COVID before the symptoms began? \" CDC Definition of close contact: within 6 feet (2 meters) for a total of 15 minutes or more over a 24-hour period. Son was tested on 11/23 and awaiting results. 3. ONSET: \"When did the COVID-19 symptoms start? \"       Yesterday    4. WORST SYMPTOM: \"What is your worst symptom? \" (e.g., cough, fever, shortness of breath, muscle aches)      Body aches, chills, cough, runny nose, loss of taste    5. COUGH: \"Do you have a cough? \" If so, ask: \"How bad is the cough? \"        Yes    6. FEVER: \"Do you have a fever? \" If so, ask: \"What is your temperature, how was it measured, and when did it start? \"      No fever, but chills    7. RESPIRATORY STATUS: \"Describe your breathing? \" (e.g., shortness of breath, wheezing, unable to speak)       Denies any SOB    8. BETTER-SAME-WORSE: Amanda Serna you getting better, staying the same or getting worse compared to yesterday? \"  If getting worse, ask, \"In what way? \"      Feels better than yesterday    9. HIGH RISK DISEASE: \"Do you have any chronic medical problems? \" (e.g., asthma, heart or lung disease, weak immune system, obesity, etc.)      Denies    10. PREGNANCY: \"Is there any chance you are pregnant? \" \"When was your last menstrual period? \"       On birth control to regulate periods. 11. OTHER SYMPTOMS: \"Do you have any other symptoms? \"  (e.g., chills, fatigue, headache, loss of smell or taste, muscle pain, sore throat; new loss of smell or taste especially support the diagnosis of COVID-19)         Body aches, chills, cough, runny nose, loss of taste that started yesterday    Protocols used: CORONAVIRUS (COVID-19) DIAGNOSED OR SUSPECTED-ADULT-    Pt reports that son was tested for covid Monday 11/23 and is awaiting results. States her symptoms  Body aches, chills, cough, runny nose, headache, loss of taste started yesterday 11/24. States her employer is requiring her to have covid test. Reviewed for pt to have virtual appt with PCP today. Warm transfer to [facility] in [facility]. Caller provided care advice and instructed to call back with worsening symptoms. Attention Provider: Thank you for allowing me to participate in the care of your patient. The patient was connected to triage in response to information provided to the [facility]. Please do not respond through this encounter as the response is not directed to a shared pool.

## 2020-11-25 NOTE — PROGRESS NOTES
2020    TELEHEALTH EVALUATION -- Audio/Visual (During [facility] public health emergency)    Due to COVID 19 outbreak, patient's office visit was converted to a virtual visit. Patient was contacted and agreed to proceed with a virtual visit via [employer] me  The risks and benefits of converting to a virtual visit were discussed in light of the current infectious disease epidemic. Patient also understood that insurance coverage and co-pays are up to their individual insurance plans. HPI:    Beverly Lerma (:  1975) has requested an audio/video evaluation for the following concern(s):    Chief Complaint   Patient presents with    Concern For COVID-19     son is positive        Exposure to covid   Son is positive  Symptoms are- HA, runny and congestion, cough, chills , fatigue, and myalgia   No fever       Review of Systems   Constitutional: Positive for chills and fatigue. Negative for fever. HENT: Positive for congestion and rhinorrhea. Negative for ear pain, postnasal drip, sore throat and trouble swallowing. Respiratory: Positive for cough. Negative for chest tightness, shortness of breath and wheezing. Cardiovascular: Negative. Musculoskeletal: Positive for myalgias. Neurological: Positive for headaches. Negative for dizziness. Prior to Visit Medications    Medication Sig Taking? Authorizing Provider   predniSONE (DELTASONE) 10 MG tablet 2 tabs PO BID x 3 days, 1 tab PO BID x 3 days, 1 tab PO daily x 3 days, then discontinue  ARIEL Murray   clobetasol (TEMOVATE) 0.05 % cream Apply topically 2 times daily. ARIEL Murray   atorvastatin (LIPITOR) 20 MG tablet Take 1 tablet by mouth daily  ARIEL Murray   norethindrone-ethinyl estradiol (ORTHO-NOVUM 1-35 TAB;NORTREL 1-35 TAB) 1-35 MG-MCG per tablet 1 tablet by mouth daily to be taken continuously. New pack every 21 days.   Historical Provider, MD   ibuprofen (ADVIL;MOTRIN) 600 MG tablet Take 1 tablet by mouth every 6 hours as needed for Pain  ARIEL Garcia       Social History     Tobacco Use    Smoking status: Never Smoker    Smokeless tobacco: Never Used   Substance Use Topics    Alcohol use: No     Alcohol/week: 0.0 standard drinks    Drug use: No            PHYSICAL EXAMINATION:  [ INSTRUCTIONS:  \"[x]\" Indicates a positive item  \"[]\" Indicates a negative item  -- DELETE ALL ITEMS NOT EXAMINED]  [x] Alert  [x] Oriented to person/place/time    [x] No apparent distress  [] Toxic appearing    [] Face flushed appearing [] Sclera clear  [] Lips are cyanotic      [x] Breathing appears normal  [] Appears tachypneic      [] Rash on visible skin    [x] Cranial Nerves II-XII grossly intact    [x] Motor grossly intact in visible upper extremities    [x] Motor grossly intact in visible lower extremities    [x] Normal Mood  [] Anxious appearing    [] Depressed appearing  [] Confused appearing      [] Poor short term memory  [] Poor long term memory    [] OTHER:      Due to this being a TeleHealth encounter, evaluation of the following organ systems is limited: Vitals/Constitutional/EENT/Resp/CV/GI//MS/Neuro/Skin/Heme-Lymph-Imm. ASSESSMENT/PLAN:  1. Cough with exposure to COVID-19 virus  - COVID-19 Ambulatory; Future  - Self quarantine  - OTC symptom control  - Continue to wear mask, social distance, and wash hands frequently  - Call 911 or go to the ER should symptoms become difficult to manage      No follow-ups on file. An  electronic signature was used to authenticate this note. --ARIEL Garcia on 11/25/2020 at 9:43 AM        Pursuant to the emergency declaration under the [facility] waiver authority and the [employer] Act, this Virtual  Visit was conducted, with patient's consent, to reduce the patient's risk of exposure to COVID-19 and provide continuity of care for an established patient.     Services were provided through a video synchronous discussion virtually to substitute for in-person clinic visit.

## 2020-11-29 LAB
SARS-COV-2: NOT DETECTED
SOURCE: NORMAL

## 2020-12-12 ENCOUNTER — PATIENT MESSAGE (OUTPATIENT)
Dept: INTERNAL MEDICINE | Age: 45
End: 2020-12-12

## 2020-12-12 RX ORDER — VALACYCLOVIR HYDROCHLORIDE 1 G/1
2000 TABLET, FILM COATED ORAL 2 TIMES DAILY
Qty: 4 TABLET | Refills: 1 | Status: SHIPPED | OUTPATIENT
Start: 2020-12-12 | End: 2020-12-13

## 2020-12-12 NOTE — TELEPHONE ENCOUNTER
Requesting medication refill. Please approve or deny this request.    Rx requested:  Requested Prescriptions     Pending Prescriptions Disp Refills    valACYclovir (VALTREX) 1 g tablet 4 tablet 1     Sig: Take 2 tablets by mouth 2 times daily for 1 day TAKE DOSES 12 HOURS APART       Last Office Visit:   11/25/2020        REASON LAST SEEN AND BY WHO:    Covid exposure     FOLLOW UP PLAN FROM LAST PCP VISIT: COPY AND PASTE FROM LAST PCP NOTE    none      PATIENT CONTACTED FOR A FOLLOW UP APPT: YES OR NO    no    Next Visit Date:  No future appointments.

## 2021-08-26 ENCOUNTER — OFFICE VISIT (OUTPATIENT)
Dept: INTERNAL MEDICINE | Age: 46
End: 2021-08-26
Payer: COMMERCIAL

## 2021-08-26 VITALS
BODY MASS INDEX: 25.51 KG/M2 | HEIGHT: 66 IN | HEART RATE: 100 BPM | WEIGHT: 158.7 LBS | OXYGEN SATURATION: 96 % | DIASTOLIC BLOOD PRESSURE: 70 MMHG | SYSTOLIC BLOOD PRESSURE: 118 MMHG | TEMPERATURE: 97.9 F | RESPIRATION RATE: 14 BRPM

## 2021-08-26 DIAGNOSIS — T63.441A BEE STING, ACCIDENTAL OR UNINTENTIONAL, INITIAL ENCOUNTER: Primary | ICD-10-CM

## 2021-08-26 PROBLEM — M25.859 HIP IMPINGEMENT SYNDROME: Status: ACTIVE | Noted: 2021-08-26

## 2021-08-26 PROBLEM — M16.12 UNILATERAL PRIMARY OSTEOARTHRITIS, LEFT HIP: Status: ACTIVE | Noted: 2018-12-21

## 2021-08-26 PROBLEM — S73.102A: Status: ACTIVE | Noted: 2018-12-21

## 2021-08-26 PROBLEM — D25.9 UTERINE LEIOMYOMA: Status: ACTIVE | Noted: 2021-08-26

## 2021-08-26 PROCEDURE — 99213 OFFICE O/P EST LOW 20 MIN: CPT

## 2021-08-26 RX ORDER — PREDNISONE 20 MG/1
60 TABLET ORAL DAILY
Qty: 3 TABLET | Refills: 0 | Status: SHIPPED | OUTPATIENT
Start: 2021-08-26 | End: 2021-08-27

## 2021-08-26 ASSESSMENT — ENCOUNTER SYMPTOMS
COLOR CHANGE: 1
EYE ITCHING: 0
SHORTNESS OF BREATH: 0
EYE DISCHARGE: 0
CHEST TIGHTNESS: 0
EYE PAIN: 0
COUGH: 0
WHEEZING: 0

## 2021-08-26 ASSESSMENT — PATIENT HEALTH QUESTIONNAIRE - PHQ9
2. FEELING DOWN, DEPRESSED OR HOPELESS: 0
SUM OF ALL RESPONSES TO PHQ9 QUESTIONS 1 & 2: 0
SUM OF ALL RESPONSES TO PHQ QUESTIONS 1-9: 0
1. LITTLE INTEREST OR PLEASURE IN DOING THINGS: 0
SUM OF ALL RESPONSES TO PHQ QUESTIONS 1-9: 0
SUM OF ALL RESPONSES TO PHQ QUESTIONS 1-9: 0

## 2021-08-26 NOTE — PATIENT INSTRUCTIONS
Patient Education        Insect Stings and Bites: Care Instructions  Your Care Instructions  Stings and bites from bees, wasps, ants, and other insects often cause pain, swelling, redness, and itching. In some people, especially children, the redness and swelling may be worse. It may extend several inches beyond the affected area. But in most cases, stings and bites don't cause reactions all over the body. If you have had a reaction to an insect sting or bite, you are at risk for a reaction if you get stung or bitten again. Follow-up care is a key part of your treatment and safety. Be sure to make and go to all appointments, and call your doctor if you are having problems. It's also a good idea to know your test results and keep a list of the medicines you take. How can you care for yourself at home? · Do not scratch or rub the skin where the sting or bite occurred. · Put a cold pack or ice cube on the area. Put a thin cloth between the ice and your skin. For some people, a paste of baking soda mixed with a little water helps relieve pain and decrease the reaction. · Take an over-the-counter antihistamine, such as diphenhydramine (Benadryl) or loratadine (Claritin), to relieve swelling, redness, and itching. Calamine lotion or hydrocortisone cream may also help. Do not give antihistamines to your child unless you have checked with the doctor first.  · Be safe with medicines. If your doctor prescribed medicine for your allergy, take it exactly as prescribed. Call your doctor if you think you are having a problem with your medicine. You will get more details on the specific medicines your doctor prescribes. · Your doctor may prescribe a shot of epinephrine to carry with you in case you have a severe reaction. Learn how and when to give yourself the shot, and keep it with you at all times. Make sure it has not . · Go to the emergency room anytime you have a severe reaction.  Go even if you have given yourself epinephrine and are feeling better. Symptoms can come back. When should you call for help? Call 911 anytime you think you may need emergency care. For example, call if:    · You have symptoms of a severe allergic reaction. These may include:  ? Sudden raised, red areas (hives) all over your body. ? Swelling of the throat, mouth, lips, or tongue. ? Trouble breathing. ? Passing out (losing consciousness). Or you may feel very lightheaded or suddenly feel weak, confused, or restless. Call your doctor now or seek immediate medical care if:    · You have symptoms of an allergic reaction not right at the sting or bite, such as:  ? A rash or small area of hives (raised, red areas on the skin). ? Itching. ? Swelling. ? Belly pain, nausea, or vomiting.     · You have a lot of swelling around the site (such as your entire arm or leg is swollen).     · You have signs of infection, such as:  ? Increased pain, swelling, redness, or warmth around the sting. ? Red streaks leading from the area. ? Pus draining from the sting. ? A fever. Watch closely for changes in your health, and be sure to contact your doctor if:    · You do not get better as expected. Where can you learn more? Go to https://[employer]. org and sign in to your Network account. Enter P390 in the [facility] box to learn more about \"Insect Stings and Bites: Care Instructions. \"     If you do not have an account, please click on the \"Sign Up Now\" link. Current as of: October 19, 2020               Content Version: 12.9  © 2006-2021 Healthwise, Incorporated. Care instructions adapted under license by [facility] ([facility]). If you have questions about a medical condition or this instruction, always ask your healthcare professional. [place] [address] any warranty or liability for your use of this information. Apply ice to the affected area for 20 minutes every hour as needed.    Watch for increasing warmth redness swelling and discharge which may be a sign of infection and return if you see these symptoms

## 2021-08-26 NOTE — PROGRESS NOTES
smokeless tobacco. She reports that she does not drink alcohol and does not use drugs. PHYSICAL EXAM     VITALS  BP: 118/70, Temp: 97.9 °F (36.6 °C), Pulse: 100, Resp: 14, SpO2: 96 %  Physical Exam  Constitutional:       General: She is not in acute distress. Appearance: Normal appearance. She is not ill-appearing or diaphoretic. HENT:      Head: Normocephalic. Mouth/Throat:      Mouth: Mucous membranes are moist.   Eyes:      General:         Right eye: No discharge. Left eye: No discharge. Conjunctiva/sclera: Conjunctivae normal.   Cardiovascular:      Rate and Rhythm: Normal rate and regular rhythm. Pulmonary:      Effort: Pulmonary effort is normal. No respiratory distress. Breath sounds: Normal breath sounds. Musculoskeletal:         General: Normal range of motion. Skin:     General: Skin is warm. Capillary Refill: Capillary refill takes less than 2 seconds. Coloration: Skin is not jaundiced or pale. Findings: Erythema present. No bruising, lesion or rash. Neurological:      General: No focal deficit present. Mental Status: She is alert and oriented to person, place, and time. Mental status is at baseline. Gait: Gait normal.   Psychiatric:         Mood and Affect: Mood normal.         Behavior: Behavior normal.       READY CARE COURSE   No orders of the defined types were placed in this encounter. Labs:  No results found for this visit on 08/26/21. IMAGING:  No orders to display     Scheduled Meds:  Continuous Infusions:  PRN Meds:. PROCEDURES:  FINAL IMPRESSION      1. Bee sting, accidental or unintentional, initial encounter        DISPOSITION/PLAN   80-year-old female with complaint of bee sting to left forearm yesterday evening.   There is moderate swelling and inflammation to the left forearm from just below the elbow to just above the wrist.  Patient reports swelling and inflammation have worsened overnight, she has taken Benadryl for itching. Pt is afebrile has nontoxic appearance and VS ar stable. Patient educated to continue Benadryl for itching and take ibuprofen to help with inflammation and pain. Single dose of oral prednisone sent to patient pharmacy. Apply ice to the affected area for 20 minutes every hour as needed. Watch for increasing warmth redness swelling and discharge which may be a sign of infection and return if you see these symptoms. PATIENT REFERRED TO:  Return for Follow up with PCP. DISCHARGE MEDICATIONS:  New Prescriptions    No medications on file     Cannot display discharge medications since this is not an admission.        Johnny Farmer, APRN - CNP

## 2021-09-20 ENCOUNTER — TELEPHONE (OUTPATIENT)
Dept: INTERNAL MEDICINE | Age: 46
End: 2021-09-20

## 2021-09-20 NOTE — TELEPHONE ENCOUNTER
Pt tested pos for COVID 9/14/21 at [facility] where her employer makes her go for testing. Her employer requires a work note to come back and where she went for the test, they don't write notes. She can go back on 9/24/2021. Pt feels much better. Will you provide a note to go back? Thank you.

## 2021-11-16 NOTE — TELEPHONE ENCOUNTER
----- Message from Jorge Swathi sent at 11/16/2021  8:47 AM EST -----  Subject: Appointment Request    Reason for Call: Semi-Routine Skin Problem    QUESTIONS  Type of Appointment? Established Patient  Reason for appointment request? No appointments available during search  Additional Information for Provider? PT states she has a sore gland on the   left side of her face. No swelling or spreading its just very sore and   uncomfortable. She has gotten this in the past and wants to know if there   is a medication Dr Nasim Zimmerman can give her to take it away. She states he   gave her Augmentin in the past. If so, please send to [email or web address].. Please advise.  ---------------------------------------------------------------------------  --------------  CALL BACK INFO  What is the best way for the office to contact you? OK to leave message on   voicemail  Preferred Call Back Phone Number? [phone]  ---------------------------------------------------------------------------  --------------  SCRIPT ANSWERS  Relationship to Patient? Self  Are you having swelling in your throat or face? No  Are you having difficulty breathing? No  Have the symptoms worsened or spread in the last day? No  Are you having fevers (100.4), chills or sweats? No  Have you recently (14 days) seen a provider for this issue? No  Have you been diagnosed with, awaiting test results for, or told that you   are suspected of having COVID-19 (Coronavirus)? (If patient has tested   negative or was tested as a requirement for work, school, or travel and   not based on symptoms, answer no)? No  Within the past two weeks have you developed any of the following symptoms   (answer no if symptoms have been present longer than 2 weeks or began   more than 2 weeks ago)?  Fever or Chills, Cough, Shortness of breath or   difficulty breathing, Loss of taste or smell, Sore throat, Nasal   congestion, Sneezing or runny nose, Fatigue or generalized From: Haritha Jaffe  To: Sissy Buerger, PA  Sent: 12/12/2020 6:00 AM EST  Subject: Non-Urgent Medical Question    Can I get valacyclovir hcl 1 gram tablet refilled? An can it have refills? I keep getting cold sores.  Please body aches   (answer no if pain is specific to a body part e.g. back pain), Diarrhea,   Headache? No  Have you had close contact with someone with COVID-19 in the last 14 days? No  (Service Expert  click yes below to proceed with [employer] As Usual   Scheduling)?  Yes

## 2022-01-21 ENCOUNTER — TELEPHONE (OUTPATIENT)
Dept: INTERNAL MEDICINE | Age: 47
End: 2022-01-21

## 2022-01-21 NOTE — TELEPHONE ENCOUNTER
Pt requesting RF on valcyclovir 1g. I do not see it in her current med list. I have sent pt msg to schedule hasn't been seen in a long time. Thank you. Comments:      Last Office Visit (last PCP visit):   11/25/2020    Next Visit Date:  No future appointments. **If hasn't been seen in over a year OR hasn't followed up according to last diabetes/ADHD visit, make appointment for patient before sending refill to provider.     Rx requested:  Requested Prescriptions      No prescriptions requested or ordered in this encounter

## 2022-06-24 ENCOUNTER — OFFICE VISIT (OUTPATIENT)
Dept: INTERNAL MEDICINE | Age: 47
End: 2022-06-24
Payer: COMMERCIAL

## 2022-06-24 VITALS
OXYGEN SATURATION: 98 % | SYSTOLIC BLOOD PRESSURE: 140 MMHG | TEMPERATURE: 98.1 F | HEIGHT: 66 IN | BODY MASS INDEX: 25.23 KG/M2 | WEIGHT: 157 LBS | HEART RATE: 76 BPM | DIASTOLIC BLOOD PRESSURE: 80 MMHG

## 2022-06-24 DIAGNOSIS — I10 HTN (HYPERTENSION) WITH GOAL TO BE DETERMINED: ICD-10-CM

## 2022-06-24 PROCEDURE — 99214 OFFICE O/P EST MOD 30 MIN: CPT | Performed by: PHYSICIAN ASSISTANT

## 2022-06-24 RX ORDER — LISINOPRIL 5 MG/1
5 TABLET ORAL DAILY
Qty: 30 TABLET | Refills: 2 | Status: SHIPPED | OUTPATIENT
Start: 2022-06-24 | End: 2022-07-08 | Stop reason: DRUGHIGH

## 2022-06-24 RX ORDER — NORETHINDRONE AND ETHINYL ESTRADIOL 0.5-0.035
KIT ORAL
COMMUNITY
Start: 2022-04-11 | End: 2022-06-24 | Stop reason: ALTCHOICE

## 2022-06-24 ASSESSMENT — ENCOUNTER SYMPTOMS
COLOR CHANGE: 0
ABDOMINAL DISTENTION: 0
DIARRHEA: 0
ABDOMINAL PAIN: 0
CONSTIPATION: 0

## 2022-06-24 NOTE — PROGRESS NOTES
Subjective:      Patient ID: Henny Brunson is a pleasant 55 y.o. female who presents today for:  Chief Complaint   Patient presents with    Other     elevated BP       [facility] [address]    Patient seen today due to acute complaint of hypertension. Patient states that she was at the plasma donation center to donate plasma in the had to refuse her due to elevated BP. Unable to discern what her BP was at the center today, however today's BP has been over 140 SBP on 3 repeat attempts. Did repeat myself manual BP which was 144/82 putting her in a disqualification for plasma donation. She states no symptoms such as chest pain, SOB, POI, wheezing, difficulty breathing, palpitations, lower leg claudication orthopnea, or other related cardiopulmonary concerns. Denies history of A. fib or arrhythmia. Currently not taking anything other than ibuprofen, intermittent clobetasol, as well as birth control. Due to patient's repeat evidence of elevated BP, will begin patient on low-dose lisinopril for the time being at 5 mg p.o. daily. Patient to follow-up within the next 1 to 2 months with PCP to assess BP and monitor medication administration. No additional concerns otherwise today.       Patient Active Problem List   Diagnosis    Dyslipidemia (high LDL; low HDL)    Uterine leiomyoma    Hip impingement syndrome    Unilateral primary osteoarthritis, left hip    Unspecified sprain of left hip, initial encounter    HTN (hypertension) with goal to be determined     Past Medical History:   Diagnosis Date    HTN (hypertension) with goal to be determined 6/24/2022    Unilateral primary osteoarthritis, left hip 12/21/2018     Past Surgical History:   Procedure Laterality Date    TUBAL LIGATION  2005    UPPER GASTROINTESTINAL ENDOSCOPY  12/01/2017    DR SINGH     Social History     Socioeconomic History    Marital status: Single     Spouse name: Not on file    Number of children: Not on file    Years of education: Not on file    Highest education level: Not on file   Occupational History    Not on file   Tobacco Use    Smoking status: Never Smoker    Smokeless tobacco: Never Used   Substance and Sexual Activity    Alcohol use: No     Alcohol/week: 0.0 standard drinks    Drug use: No    Sexual activity: Not on file   Other Topics Concern    Not on file   Social History Narrative    Not on file     Social Determinants of Health     Financial Resource Strain:     Difficulty of Paying Living Expenses: Not on file   Food Insecurity:     Worried About Running Out of Food in the Last Year: Not on file    Frances of Food in the Last Year: Not on file   Transportation Needs:     Lack of Transportation (Medical): Not on file    Lack of Transportation (Non-Medical):  Not on file   Physical Activity:     Days of Exercise per Week: Not on file    Minutes of Exercise per Session: Not on file   Stress:     Feeling of Stress : Not on file   Social Connections:     Frequency of Communication with Friends and Family: Not on file    Frequency of Social Gatherings with Friends and Family: Not on file    Attends Islam Services: Not on file    Active Member of [address] [place] [address] [employer] or Organizations: Not on file    Attends Club or Organization Meetings: Not on file    Marital Status: Not on file   Intimate Partner Violence:     Fear of Current or Ex-Partner: Not on file    Emotionally Abused: Not on file    Physically Abused: Not on file    Sexually Abused: Not on file   Housing Stability:     Unable to Pay for Housing in the Last Year: Not on file    Number of [place] in the Last Year: Not on file    Unstable Housing in the Last Year: Not on file     Family History   Problem Relation Age of Onset    High Cholesterol Mother     High Blood Pressure Mother     Diabetes Maternal Grandmother     Heart Disease Maternal Grandmother     Heart Disease Maternal Grandfather      Allergies   Allergen Reactions    Sulfa Antibiotics Hives Review of Systems   Constitutional: Negative for activity change, appetite change, fatigue and fever. Cardiovascular: Negative for chest pain, palpitations and leg swelling. Gastrointestinal: Negative for abdominal distention, abdominal pain, constipation and diarrhea. Skin: Negative for color change. Neurological: Negative for dizziness, tremors, syncope, weakness, light-headedness and headaches. Psychiatric/Behavioral: Negative for agitation, behavioral problems, confusion, decreased concentration and dysphoric mood. All other systems reviewed and are negative. Objective:   BP (!) 140/80 (Site: Left Upper Arm, Position: Sitting, Cuff Size: Small Adult)   Pulse 76   Temp 98.1 °F (36.7 °C)   Ht 5' 6\" (1.676 m)   Wt 157 lb (71.2 kg)   SpO2 98%   BMI 25.34 kg/m²     Physical Exam  Constitutional:       General: She is not in acute distress. Appearance: Normal appearance. She is normal weight. She is not ill-appearing or diaphoretic. HENT:      Head: Normocephalic and atraumatic. Nose: Nose normal. No congestion or rhinorrhea. Mouth/Throat:      Mouth: Mucous membranes are moist.      Pharynx: Oropharynx is clear. Eyes:      General: No scleral icterus. Extraocular Movements: Extraocular movements intact. Conjunctiva/sclera: Conjunctivae normal.      Pupils: Pupils are equal, round, and reactive to light. Cardiovascular:      Rate and Rhythm: Normal rate and regular rhythm. No extrasystoles are present. Pulses: Normal pulses. Carotid pulses are 2+ on the right side and 2+ on the left side. Radial pulses are 2+ on the right side and 2+ on the left side. Heart sounds: Normal heart sounds, S1 normal and S2 normal.   Pulmonary:      Effort: Pulmonary effort is normal.      Breath sounds: Normal breath sounds. Abdominal:      General: Abdomen is flat. Palpations: Abdomen is soft. Skin:     General: Skin is warm and dry. Neurological:      General: No focal deficit present. Mental Status: She is alert and oriented to person, place, and time. Mental status is at baseline. Sensory: No sensory deficit. Motor: No weakness. Coordination: Coordination normal.      Gait: Gait normal.   Psychiatric:         Mood and Affect: Mood normal.         Behavior: Behavior normal.         Thought Content: Thought content normal.         Judgment: Judgment normal.         Assessment:       Diagnosis Orders   1. HTN (hypertension) with goal to be determined           Plan:      No orders of the defined types were placed in this encounter. Orders Placed This Encounter   Medications    lisinopril (PRINIVIL;ZESTRIL) 5 MG tablet     Sig: Take 1 tablet by mouth daily     Dispense:  30 tablet     Refill:  2       Patient to begin lisinopril 5 mg p.o. daily. Patient to follow-up as needed within 1 to 2 months with her primary care provider. Follow-up ED if any cardiopulmonary complaints/chest pain/acute SOB. No follow-ups on file. Side effects, adverse effects of the medication prescribed today, as well as treatment plan and result expectations have been discussed withthe patient who expresses understanding and desires to proceed.     Julia Calvin, [id number]

## 2022-07-08 ENCOUNTER — OFFICE VISIT (OUTPATIENT)
Dept: INTERNAL MEDICINE | Age: 47
End: 2022-07-08
Payer: COMMERCIAL

## 2022-07-08 VITALS
OXYGEN SATURATION: 97 % | SYSTOLIC BLOOD PRESSURE: 142 MMHG | HEART RATE: 48 BPM | DIASTOLIC BLOOD PRESSURE: 84 MMHG | RESPIRATION RATE: 16 BRPM

## 2022-07-08 DIAGNOSIS — I10 HTN (HYPERTENSION) WITH GOAL TO BE DETERMINED: ICD-10-CM

## 2022-07-08 DIAGNOSIS — I49.9 IRREGULAR HEART RHYTHM: ICD-10-CM

## 2022-07-08 DIAGNOSIS — Z00.00 ENCOUNTER FOR WELL ADULT EXAM WITHOUT ABNORMAL FINDINGS: Primary | ICD-10-CM

## 2022-07-08 DIAGNOSIS — E78.5 DYSLIPIDEMIA (HIGH LDL; LOW HDL): ICD-10-CM

## 2022-07-08 PROCEDURE — 99396 PREV VISIT EST AGE 40-64: CPT | Performed by: PHYSICIAN ASSISTANT

## 2022-07-08 PROCEDURE — 93000 ELECTROCARDIOGRAM COMPLETE: CPT | Performed by: PHYSICIAN ASSISTANT

## 2022-07-08 RX ORDER — LISINOPRIL 10 MG/1
10 TABLET ORAL DAILY
Qty: 90 TABLET | Refills: 3 | Status: SHIPPED | OUTPATIENT
Start: 2022-07-08 | End: 2022-08-10 | Stop reason: ALTCHOICE

## 2022-07-08 SDOH — ECONOMIC STABILITY: FOOD INSECURITY: WITHIN THE PAST 12 MONTHS, THE FOOD YOU BOUGHT JUST DIDN'T LAST AND YOU DIDN'T HAVE MONEY TO GET MORE.: NEVER TRUE

## 2022-07-08 SDOH — ECONOMIC STABILITY: FOOD INSECURITY: WITHIN THE PAST 12 MONTHS, YOU WORRIED THAT YOUR FOOD WOULD RUN OUT BEFORE YOU GOT MONEY TO BUY MORE.: NEVER TRUE

## 2022-07-08 ASSESSMENT — PATIENT HEALTH QUESTIONNAIRE - PHQ9
SUM OF ALL RESPONSES TO PHQ9 QUESTIONS 1 & 2: 0
SUM OF ALL RESPONSES TO PHQ QUESTIONS 1-9: 0
1. LITTLE INTEREST OR PLEASURE IN DOING THINGS: 0
SUM OF ALL RESPONSES TO PHQ QUESTIONS 1-9: 0
2. FEELING DOWN, DEPRESSED OR HOPELESS: 0
SUM OF ALL RESPONSES TO PHQ QUESTIONS 1-9: 0
SUM OF ALL RESPONSES TO PHQ QUESTIONS 1-9: 0

## 2022-07-08 ASSESSMENT — ENCOUNTER SYMPTOMS
RESPIRATORY NEGATIVE: 1
GASTROINTESTINAL NEGATIVE: 1

## 2022-07-08 ASSESSMENT — SOCIAL DETERMINANTS OF HEALTH (SDOH): HOW HARD IS IT FOR YOU TO PAY FOR THE VERY BASICS LIKE FOOD, HOUSING, MEDICAL CARE, AND HEATING?: NOT HARD AT ALL

## 2022-07-08 NOTE — PROGRESS NOTES
Well Adult Note  Name: Santosh Bahena Date: 2022   MRN: [id number] Sex: Female   Age: 55 y.o. Ethnicity: Non- / Non    : 1975 Race: White (non-)      Aneta Vazquez is here for well adult exam.  History:  HTN- just started on Lisinopril 5 mg , just not at goal yet   Sees gyn for PAP and mammogram        Review of Systems   Constitutional: Negative. HENT: Negative. Respiratory: Negative. Cardiovascular: Negative. Gastrointestinal: Negative. Genitourinary: Negative. Musculoskeletal: Negative. Neurological: Negative. Hematological: Negative. Psychiatric/Behavioral: Negative. Allergies   Allergen Reactions    Sulfa Antibiotics Hives    Amoxicillin Headaches, Nausea And Vomiting and Swelling         Prior to Visit Medications    Medication Sig Taking? Authorizing Provider   lisinopril (PRINIVIL;ZESTRIL) 10 MG tablet Take 1 tablet by mouth daily Yes ARIEL Leon   norethindrone-ethinyl estradiol (ORTHO-NOVUM 1-35 TAB;NORTREL 1-35 TAB) 1-35 MG-MCG per tablet 1 tablet by mouth daily to be taken continuously. New pack every 21 days.  Yes Historical Provider, MD   ibuprofen (ADVIL;MOTRIN) 600 MG tablet Take 1 tablet by mouth every 6 hours as needed for Pain Yes ARIEL Leon         Past Medical History:   Diagnosis Date    HTN (hypertension) with goal to be determined 2022    Unilateral primary osteoarthritis, left hip 2018       Past Surgical History:   Procedure Laterality Date    TUBAL LIGATION      UPPER GASTROINTESTINAL ENDOSCOPY  2017    DR SINGH         Family History   Problem Relation Age of Onset    High Cholesterol Mother     High Blood Pressure Mother     Diabetes Maternal Grandmother     Heart Disease Maternal Grandmother     Heart Disease Maternal Grandfather        Social History     Tobacco Use    Smoking status: Never Smoker    Smokeless tobacco: Never Used   Substance Use Topics    Alcohol use: No     Alcohol/week: 0.0 standard drinks    Drug use: No       Objective   BP (!) 142/84 (Site: Right Upper Arm, Position: Sitting, Cuff Size: Medium Adult)   Pulse (!) 48   Resp 16   SpO2 97%   Wt Readings from Last 3 Encounters:   06/24/22 157 lb (71.2 kg)   08/26/21 158 lb 11.2 oz (72 kg)   08/06/20 168 lb (76.2 kg)     There were no vitals filed for this visit. Physical Exam  Vitals reviewed. Constitutional:       Appearance: Normal appearance. HENT:      Head: Normocephalic and atraumatic. Eyes:      Extraocular Movements: Extraocular movements intact. Conjunctiva/sclera: Conjunctivae normal.      Pupils: Pupils are equal, round, and reactive to light. Cardiovascular:      Rate and Rhythm: Normal rate. Rhythm regularly irregular. Pulses: Normal pulses. Heart sounds: Normal heart sounds. Pulmonary:      Effort: Pulmonary effort is normal.      Breath sounds: Normal breath sounds. Abdominal:      General: Bowel sounds are normal.      Palpations: Abdomen is soft. Neurological:      General: No focal deficit present. Mental Status: She is alert and oriented to person, place, and time. Psychiatric:         Mood and Affect: Mood normal.         Behavior: Behavior normal.         Thought Content: Thought content normal.           Assessment   Plan   1. Encounter for well adult exam without abnormal findings  2. HTN (hypertension) with goal to be determined  -     CBC with Auto Differential; Future  -     Comprehensive Metabolic Panel; Future  -     lisinopril (PRINIVIL;ZESTRIL) 10 MG tablet; Take 1 tablet by mouth daily, Disp-90 tablet, R-3Normal  -     Not at goal, increased to  Lisinopril 10 mg   3. Dyslipidemia (high LDL; low HDL)  -     Lipid Panel; Future  -    Not on statin, repeat labs today   4. Irregular heart rhythm  -      asymptomatic    -      EKG 12 lead;  Future- showed f trigeminal PVC's   - will do labs and have her see cardiology   -     Ambulatory referral to Cardiology  -     [facility]; Future         Personalized Preventive Plan   Current Health Maintenance Status  Immunization History   Administered Date(s) Administered    Influenza Vaccine, unspecified formulation 10/11/2016    Influenza Virus Vaccine 10/22/2018    Influenza, Quadv, 6 mo and older, IM (Fluzone, Flulaval) 10/22/2018    Influenza, Quadv, IM, (6 mo and older Fluzone, Flulaval, Fluarix and 3 yrs and older Afluria) 10/23/2017, 10/23/2020    Influenza, Quadv, IM, PF (6 mo and older Fluzone, Flulaval, Fluarix, and 3 yrs and older Afluria) 01/28/2020        Health Maintenance   Topic Date Due    COVID-19 Vaccine (1) Never done    Hepatitis C screen  Never done    DTaP/Tdap/Td vaccine (1 - Tdap) Never done    Flu vaccine (1) 09/01/2022    Depression Screen  07/08/2023    Cervical cancer screen  03/26/2024    Lipids  07/12/2027    Colorectal Cancer Screen  12/13/2027    HIV screen  Completed    Hepatitis A vaccine  Aged Out    Hepatitis B vaccine  Aged Out    Hib vaccine  Aged Out    Meningococcal (ACWY) vaccine  Aged Out    Pneumococcal 0-64 years Vaccine  Aged Out     Recommendations for [employer] Due: see orders and patient instructions/AVS.    No follow-ups on file.

## 2022-07-12 DIAGNOSIS — I10 HTN (HYPERTENSION) WITH GOAL TO BE DETERMINED: ICD-10-CM

## 2022-07-12 DIAGNOSIS — I49.9 IRREGULAR HEART RHYTHM: ICD-10-CM

## 2022-07-12 DIAGNOSIS — E78.5 DYSLIPIDEMIA (HIGH LDL; LOW HDL): ICD-10-CM

## 2022-07-12 LAB
ALBUMIN SERPL-MCNC: 3.9 G/DL (ref 3.5–4.6)
ALP BLD-CCNC: 55 U/L (ref 40–130)
ALT SERPL-CCNC: <5 U/L (ref 0–33)
ANION GAP SERPL CALCULATED.3IONS-SCNC: 12 MEQ/L (ref 9–15)
AST SERPL-CCNC: 16 U/L (ref 0–35)
BASOPHILS ABSOLUTE: 0 K/UL (ref 0–0.2)
BASOPHILS RELATIVE PERCENT: 1 %
BILIRUB SERPL-MCNC: 0.7 MG/DL (ref 0.2–0.7)
BUN BLDV-MCNC: 11 MG/DL (ref 6–20)
CALCIUM SERPL-MCNC: 9 MG/DL (ref 8.5–9.9)
CHLORIDE BLD-SCNC: 105 MEQ/L (ref 95–107)
CHOLESTEROL, TOTAL: 236 MG/DL (ref 0–199)
CO2: 22 MEQ/L (ref 20–31)
CREAT SERPL-MCNC: 1.01 MG/DL (ref 0.5–0.9)
EOSINOPHILS ABSOLUTE: 0.1 K/UL (ref 0–0.7)
EOSINOPHILS RELATIVE PERCENT: 1.4 %
GFR AFRICAN AMERICAN: >60
GFR NON-AFRICAN AMERICAN: 58.9
GLOBULIN: 2.5 G/DL (ref 2.3–3.5)
GLUCOSE BLD-MCNC: 86 MG/DL (ref 70–99)
HCT VFR BLD CALC: 40.4 % (ref 37–47)
HDLC SERPL-MCNC: 49 MG/DL (ref 40–59)
HEMOGLOBIN: 13.3 G/DL (ref 12–16)
LDL CHOLESTEROL CALCULATED: 162 MG/DL (ref 0–129)
LYMPHOCYTES ABSOLUTE: 2.2 K/UL (ref 1–4.8)
LYMPHOCYTES RELATIVE PERCENT: 45.8 %
MCH RBC QN AUTO: 30.2 PG (ref 27–31.3)
MCHC RBC AUTO-ENTMCNC: 32.9 % (ref 33–37)
MCV RBC AUTO: 91.7 FL (ref 82–100)
MONOCYTES ABSOLUTE: 0.3 K/UL (ref 0.2–0.8)
MONOCYTES RELATIVE PERCENT: 6.1 %
NEUTROPHILS ABSOLUTE: 2.2 K/UL (ref 1.4–6.5)
NEUTROPHILS RELATIVE PERCENT: 45.7 %
PDW BLD-RTO: 12.9 % (ref 11.5–14.5)
PLATELET # BLD: 191 K/UL (ref 130–400)
POTASSIUM SERPL-SCNC: 5 MEQ/L (ref 3.4–4.9)
RBC # BLD: 4.4 M/UL (ref 4.2–5.4)
SODIUM BLD-SCNC: 139 MEQ/L (ref 135–144)
TOTAL PROTEIN: 6.4 G/DL (ref 6.3–8)
TRIGL SERPL-MCNC: 127 MG/DL (ref 0–150)
TSH SERPL DL<=0.05 MIU/L-ACNC: 3.38 UIU/ML (ref 0.44–3.86)
WBC # BLD: 4.8 K/UL (ref 4.8–10.8)

## 2022-07-22 ENCOUNTER — TELEPHONE (OUTPATIENT)
Dept: INTERNAL MEDICINE | Age: 47
End: 2022-07-22

## 2022-07-22 ENCOUNTER — OFFICE VISIT (OUTPATIENT)
Dept: INTERNAL MEDICINE | Age: 47
End: 2022-07-22
Payer: COMMERCIAL

## 2022-07-22 ENCOUNTER — HOSPITAL ENCOUNTER (OUTPATIENT)
Dept: ULTRASOUND IMAGING | Age: 47
Discharge: HOME | End: 2022-07-24
Payer: COMMERCIAL

## 2022-07-22 VITALS
OXYGEN SATURATION: 97 % | HEIGHT: 66 IN | HEART RATE: 104 BPM | SYSTOLIC BLOOD PRESSURE: 136 MMHG | RESPIRATION RATE: 16 BRPM | WEIGHT: 155.6 LBS | DIASTOLIC BLOOD PRESSURE: 66 MMHG | BODY MASS INDEX: 25.01 KG/M2

## 2022-07-22 DIAGNOSIS — M79.89 LEFT ARM SWELLING: ICD-10-CM

## 2022-07-22 DIAGNOSIS — I80.9 THROMBOPHLEBITIS: ICD-10-CM

## 2022-07-22 DIAGNOSIS — I80.9 THROMBOPHLEBITIS: Primary | ICD-10-CM

## 2022-07-22 PROCEDURE — 93971 EXTREMITY STUDY: CPT

## 2022-07-22 PROCEDURE — 99214 OFFICE O/P EST MOD 30 MIN: CPT | Performed by: PHYSICIAN ASSISTANT

## 2022-07-22 RX ORDER — NORETHINDRONE AND ETHINYL ESTRADIOL 0.5-0.035
KIT ORAL
COMMUNITY
Start: 2022-07-02 | End: 2022-08-10

## 2022-07-22 RX ORDER — IBUPROFEN 800 MG/1
TABLET ORAL
COMMUNITY
Start: 2019-12-17

## 2022-07-22 NOTE — TELEPHONE ENCOUNTER
[facility] called in stating that patient's results from [address] Duplex Upper Extremity left venous did come in. They would like to know what needs done for patient. Per Keshia Ignacio, patient may leave the facility but is to take 325mg of Aspirin today and Keshia Ignacio will call her in a little bit.

## 2022-07-22 NOTE — PROGRESS NOTES
Talia Mayfield (: 1975) is a 55 y.o. female, Established patient, here for evaluation of the following chief complaint(s):  Arm Pain (Left arm pain since donating plasma on 22. She is now unable to straighten out her arm. She has muscle pulling in her chest when lifting the arm. This has been affecting her at work. )        ASSESSMENT/PLAN:  1. Thrombophlebitis  2. Left arm swelling  - likely a thrombophlebitis, ordered the US stat   - US DUP UPPER EXTREMITY LEFT VENOUS; Future  - if neg for DVT, will add aspirin 325 mg daily and warm compresses    - if positive, will send her to the ER       No follow-ups on file. SUBJECTIVE/OBJECTIVE:  HPI      Decreased ROM of the left elbow, started , after donating plasma  States pain in the upper arm, bicep region   States she can not straighten her left elbow, fells \"pulling\" in the arm and chest     Review of Systems   Constitutional: Negative. HENT: Negative. Respiratory: Negative. Cardiovascular: Negative. Musculoskeletal:  Positive for arthralgias and myalgias. Skin:  Negative for color change. Physical Exam  Vitals reviewed. Constitutional:       Appearance: Normal appearance. Cardiovascular:      Pulses: Normal pulses. Musculoskeletal:      Comments: Decreased ROM of the left elbow  Tenderness in the bicep region   No bruising    Neurological:      Mental Status: She is alert. Vitals:    22 1014   BP: 136/66   Site: Right Upper Arm   Position: Sitting   Cuff Size: Medium Adult   Pulse: (!) 104   Resp: 16   SpO2: 97%   Weight: 155 lb 9.6 oz (70.6 kg)   Height: 5' 6\" (1.676 m)                 An electronic signature was used to authenticate this note.     --ARIEL Warner

## 2022-07-24 ASSESSMENT — ENCOUNTER SYMPTOMS
RESPIRATORY NEGATIVE: 1
COLOR CHANGE: 0

## 2022-07-26 ENCOUNTER — TELEPHONE (OUTPATIENT)
Dept: INTERNAL MEDICINE | Age: 47
End: 2022-07-26

## 2022-07-26 NOTE — TELEPHONE ENCOUNTER
I tried to call this patient, no answer. I then left her an extended VM, then emailed her the instructions. - tell her to check her my chart.   I want her to be on aspirin 325 mg daily, and I have ordered a referral to Dr Jaymie Barry to see if she need to get the superficial clot removed

## 2022-07-26 NOTE — TELEPHONE ENCOUNTER
Pt had ultrasound last week for clot in arm. She stated its painful but bearable now. She still cannot extend her arm fully. She would like to know if this is normal or what she should do. She is ok with a detailed message on vm and or mychart if need be.     Thank you

## 2022-07-26 NOTE — TELEPHONE ENCOUNTER
Pt called stated she called the intervention radiologist and they do not have the referral. They are requesting it to be faxed to them.     Thank you

## 2022-07-27 NOTE — TELEPHONE ENCOUNTER
Pt came to the office today. She stated all that is needed is a \"code\" for her to be able to schedule the appointment.     Thank you

## 2022-07-28 ENCOUNTER — TELEPHONE (OUTPATIENT)
Dept: INTERNAL MEDICINE | Age: 47
End: 2022-07-28

## 2022-07-28 DIAGNOSIS — I80.9 THROMBOPHLEBITIS: ICD-10-CM

## 2022-07-28 DIAGNOSIS — M79.89 LEFT ARM SWELLING: Primary | ICD-10-CM

## 2022-07-28 NOTE — TELEPHONE ENCOUNTER
Patient called in again today stating that she called 's office and they still have not received a referral for her. I called Jordan Harmon at 's office and she confirmed that the referral has not been sent. Marleni would like this sent as soon as possible as she still cannot straighten out her arm. I will route this message to Clara Garcia to advise.

## 2022-07-29 ENCOUNTER — INITIAL CONSULT (OUTPATIENT)
Dept: INTERVENTIONAL RADIOLOGY/VASCULAR | Age: 47
End: 2022-07-29
Payer: COMMERCIAL

## 2022-07-29 VITALS
WEIGHT: 155 LBS | HEART RATE: 42 BPM | DIASTOLIC BLOOD PRESSURE: 74 MMHG | BODY MASS INDEX: 25.02 KG/M2 | SYSTOLIC BLOOD PRESSURE: 129 MMHG

## 2022-07-29 DIAGNOSIS — M79.89 LEFT ARM SWELLING: ICD-10-CM

## 2022-07-29 DIAGNOSIS — I80.8 SUPERFICIAL THROMBOPHLEBITIS OF LEFT UPPER EXTREMITY: Primary | ICD-10-CM

## 2022-07-29 DIAGNOSIS — R93.89 ABNORMAL ULTRASOUND: ICD-10-CM

## 2022-07-29 PROCEDURE — 99244 OFF/OP CNSLTJ NEW/EST MOD 40: CPT | Performed by: NURSE PRACTITIONER

## 2022-07-29 RX ORDER — ASPIRIN 325 MG
325 TABLET ORAL DAILY
COMMUNITY

## 2022-07-29 ASSESSMENT — ENCOUNTER SYMPTOMS
VOMITING: 0
BACK PAIN: 0
COLOR CHANGE: 0
EYES NEGATIVE: 1
ABDOMINAL PAIN: 0
DIARRHEA: 0
SHORTNESS OF BREATH: 0
SORE THROAT: 0
GASTROINTESTINAL NEGATIVE: 1
WHEEZING: 0
NAUSEA: 0
RESPIRATORY NEGATIVE: 1
COUGH: 0
TROUBLE SWALLOWING: 0

## 2022-07-29 NOTE — PROGRESS NOTES
VASCULAR MEDICINE AND INTERVENTIONAL RADIOLOGY DEPARTMENT:         [facility], a female of 55 y.o. came to the office 7/29/2022. Chief Complaint   Patient presents with    Arm Pain     Rt arm        7/29/2022 HPI:Margarita Wahl referred by  [address] PA for consultation and evaluation of LUE upper arm acute pain and swelling. US duplex reports superficial thrombus. She donated plasma July 8th and during infusion had several infiltrates of peripheral IV. IV was in [facility]. Next day developed Left arm swelling and pain. She is not able to completely extend LUE she reports due to the discomfort near elbow and to medial distal upper arm. She states this has not improved since onset of symptoms. Denies injury. She reports the pain and swelling are slowly resolving and symptoms are not as severe. Denies chest pain. Denies dyspnea.      Family History   Problem Relation Age of Onset    High Cholesterol Mother     High Blood Pressure Mother     Diabetes Maternal Grandmother     Heart Disease Maternal Grandmother     Heart Disease Maternal Grandfather        Past Surgical History:   Procedure Laterality Date    TUBAL LIGATION  2005    UPPER GASTROINTESTINAL ENDOSCOPY  12/01/2017    DR Walt Kenyon        Past Medical History:   Diagnosis Date    HTN (hypertension) with goal to be determined 6/24/2022    Unilateral primary osteoarthritis, left hip 12/21/2018       Social History     Socioeconomic History    Marital status: Single   Tobacco Use    Smoking status: Never    Smokeless tobacco: Never   Substance and Sexual Activity    Alcohol use: No     Alcohol/week: 0.0 standard drinks    Drug use: No     Social Determinants of Health     Financial Resource Strain: Low Risk     Difficulty of Paying Living Expenses: Not hard at all   Food Insecurity: No Food Insecurity    Worried About Running Out of Food in the Last Year: Never true    Ran Out of Food in the Last Year: Never true       Allergies   Allergen Reactions Sulfa Antibiotics Hives    Amoxicillin Headaches, Nausea And Vomiting and Swelling       Current Outpatient Medications on File Prior to Visit   Medication Sig Dispense Refill    aspirin 325 MG tablet Take 325 mg by mouth in the morning. ibuprofen (ADVIL;MOTRIN) 800 MG tablet       NORTREL 0.5/35, 28, 0.5-35 MG-MCG per tablet TAKE 1 TABLET BY MOUTH ONCE DAILY. NEW PACK EVER 21 DAYS (Patient not taking: Reported on 7/22/2022)      lisinopril (PRINIVIL;ZESTRIL) 10 MG tablet Take 1 tablet by mouth daily 90 tablet 3    norethindrone-ethinyl estradiol (ORTHO-NOVUM 1-35 TAB;NORTREL 1-35 TAB) 1-35 MG-MCG per tablet 1 tablet by mouth daily to be taken continuously. New pack every 21 days. ibuprofen (ADVIL;MOTRIN) 600 MG tablet Take 1 tablet by mouth every 6 hours as needed for Pain 120 tablet 0     No current facility-administered medications on file prior to visit. Review of Systems   Constitutional: Negative. Negative for chills, fatigue and fever. HENT: Negative. Negative for congestion, ear pain, sore throat and trouble swallowing. Eyes: Negative. Negative for visual disturbance. Respiratory: Negative. Negative for cough, shortness of breath and wheezing. Cardiovascular:  Negative for chest pain, palpitations and leg swelling. Acute pain and swelling to left upper medial arm. Gastrointestinal: Negative. Negative for abdominal pain, diarrhea, nausea and vomiting. Endocrine: Negative. Genitourinary: Negative. Negative for difficulty urinating, dysuria and hematuria. Musculoskeletal:  Negative for back pain. Unable to fully extend LUE due to pain at elbow and medial upper arm   Skin: Negative. Negative for color change, rash and wound. Neurological: Negative. Negative for dizziness, weakness, light-headedness, numbness and headaches. Hematological: Negative. Does not bruise/bleed easily. Psychiatric/Behavioral: Negative. The patient is not nervous/anxious. All other systems reviewed and are negative. OBJECTIVE:  /74   Pulse (!) 42   Wt 155 lb (70.3 kg)   BMI 25.02 kg/m²     Physical Exam  Constitutional:       General: She is not in acute distress. Appearance: Normal appearance. She is not ill-appearing. HENT:      Head: Normocephalic. Nose: No congestion. Cardiovascular:      Rate and Rhythm: Normal rate. Heart sounds: Normal heart sounds. Pulmonary:      Effort: Pulmonary effort is normal.   Abdominal:      General: Bowel sounds are normal.      Palpations: Abdomen is soft. Musculoskeletal:         General: Swelling (mild edema with tenderness to palpation to left upper medial arm) and tenderness present. No deformity or signs of injury. Normal range of motion. Cervical back: Normal range of motion. Right lower leg: No edema. Left lower leg: No edema. Skin:     General: Skin is warm and dry. Neurological:      Mental Status: She is alert and oriented to person, place, and time. Psychiatric:         Mood and Affect: Mood normal.         Behavior: Behavior normal.         7/22/2022:   Narrative   EXAMINATION:  DUPLEX DOPPLER VENOUS ULTRASOUND OF THE LEFT UPPER EXTREMITY. CLINICAL HISTORY:  LEFT ARM SWELLING. COMPARISONS:  NONE AVAILABLE       TECHNIQUE:  Real-time sonography was obtained by the ultrasound technologist without direct radiologist supervision. Static images are available for review. FINDINGS:  Probably acute or recent thrombosis involves the left basilic vein. Some venous flow is noted adjacent to this clot. The visualized left internal jugular, subclavian, axillary, brachial, and cephalic veins appear to be patent and free of convincing intraluminal thrombus           Impression   ACUTE OR RECENT THROMBOSIS WITHIN THE LEFT BASILIC VEIN. ASSESSMENT AND PLAN:  Chart review as noted of referring HCP last OV. Entire Medication list reviewed.     Above LUE duplex reviewed personally by myself and discussed with patient. Diagnosis Orders   1. Superficial thrombophlebitis of left upper extremity  Elastic Bandages & Supports (MEDICAL COMPRESSION STOCKINGS) MISC      2. Left arm swelling  Elastic Bandages & Supports (MEDICAL COMPRESSION STOCKINGS) MISC      3. Abnormal ultrasound  Elastic Bandages & Supports (MEDICAL COMPRESSION STOCKINGS) MISC             Plan:     No orders of the defined types were placed in this encounter. Orders Placed This Encounter   Medications    Elastic Bandages & Supports (MEDICAL COMPRESSION STOCKINGS) MISC     Si each by Does not apply route daily LUE compression sleeve 20-30 mmhg compression sleeve wear to LUE daily during day and off Qhs. Wear as tolerated. Do not wear if causes increased pain. Wear until LUE symptoms are resolved and/or PRN for comfort. Dispense:  1 each     Refill:  5      --  I discussed with patient she has superficial thrombophlebitis in LUE. Treatment consists of PRN elevation, warm and/or cold compress, OTC pain medications, stay on ASA and wear compression sleeve as directed until resolution. I discussed that typically this should not stop her from being able to fully extend her arm. If this does not resolve by next follow up, would recommend she then follow up with her PCP if all other symptoms have resolved as this would then not be a vascular issue. She verbalized understanding. --  LUE compression sleeve wear daily during day and off nightly as tolerated. --  Return in 3 weeks follow up. --  Continue 325 mg ASA daily x 1 month then either d/c or go to 81 mg PO daily. Thank You Dr. Madyson GEORGE for referral of your patient to our practice for care. Total time spent for this encounter:  35  minutes.     Moises Go, APRN - CNP

## 2022-08-09 ENCOUNTER — TELEPHONE (OUTPATIENT)
Dept: INTERVENTIONAL RADIOLOGY/VASCULAR | Age: 47
End: 2022-08-09

## 2022-08-10 ENCOUNTER — OFFICE VISIT (OUTPATIENT)
Dept: CARDIOLOGY CLINIC | Age: 47
End: 2022-08-10
Payer: COMMERCIAL

## 2022-08-10 VITALS
HEART RATE: 87 BPM | HEIGHT: 65 IN | WEIGHT: 154.8 LBS | BODY MASS INDEX: 25.79 KG/M2 | DIASTOLIC BLOOD PRESSURE: 76 MMHG | SYSTOLIC BLOOD PRESSURE: 126 MMHG | RESPIRATION RATE: 16 BRPM | OXYGEN SATURATION: 97 %

## 2022-08-10 DIAGNOSIS — I10 HTN (HYPERTENSION) WITH GOAL TO BE DETERMINED: Primary | ICD-10-CM

## 2022-08-10 PROCEDURE — 99203 OFFICE O/P NEW LOW 30 MIN: CPT | Performed by: INTERNAL MEDICINE

## 2022-08-10 RX ORDER — PREDNISONE 50 MG/1
TABLET ORAL
COMMUNITY
Start: 2022-07-29 | End: 2022-08-10

## 2022-08-10 RX ORDER — CHLORTHALIDONE 25 MG/1
50 TABLET ORAL DAILY
Qty: 30 TABLET | Refills: 5 | Status: SHIPPED | OUTPATIENT
Start: 2022-08-10 | End: 2022-09-26 | Stop reason: SDUPTHER

## 2022-08-10 ASSESSMENT — ENCOUNTER SYMPTOMS
COUGH: 0
CHEST TIGHTNESS: 0
SHORTNESS OF BREATH: 0
CONSTIPATION: 0
DIARRHEA: 0
APNEA: 0
COLOR CHANGE: 0
NAUSEA: 0
ABDOMINAL PAIN: 0
WHEEZING: 0
EYE REDNESS: 0
RHINORRHEA: 0
VOMITING: 0

## 2022-08-10 NOTE — PROGRESS NOTES
Chief Complaint   Patient presents with    New Patient     ARIEL Demarco, ref for Irregular Heart Rhythm. Patient presents for initial medical evaluation. Patient is followed on a regular basis by ARIEL Garcia. Hypertension  No echocardiograms, no nuclear stress test, no coronary angiograms    8/10/2022  First clinic visit referred to our office for management of hypertension  Patient reports that about 2 months ago she went to donate plasma and she was found with high blood pressure. Patient was started on blood pressure medication which was further increased secondary to blood pressure not being well controlled. Patient was also found bradycardic  Patient reports palpitation episodes for the last 2 months  Patient lives in a one-story house with a basement.   Reports that her bedroom is in the basement and she goes up and down frequently throughout the day with a cardiac issues      Patient Active Problem List   Diagnosis    Dyslipidemia (high LDL; low HDL)    Uterine leiomyoma    Hip impingement syndrome    Unilateral primary osteoarthritis, left hip    Unspecified sprain of left hip, initial encounter    HTN (hypertension) with goal to be determined       Past Surgical History:   Procedure Laterality Date    TUBAL LIGATION  2005    UPPER GASTROINTESTINAL ENDOSCOPY  12/01/2017    DR SINGH       Social History     Socioeconomic History    Marital status: Single     Spouse name: None    Number of children: None    Years of education: None    Highest education level: None   Tobacco Use    Smoking status: Never    Smokeless tobacco: Never   Substance and Sexual Activity    Alcohol use: No     Alcohol/week: 0.0 standard drinks    Drug use: No     Social Determinants of Health     Financial Resource Strain: Low Risk     Difficulty of Paying Living Expenses: Not hard at all   Food Insecurity: No Food Insecurity    Worried About Running Out of Food in the Last Year: Never true    Ran Out of Food in the Last Year: Never true       Family History   Problem Relation Age of Onset    High Cholesterol Mother     High Blood Pressure Mother     Diabetes Maternal Grandmother     Heart Disease Maternal Grandmother     Heart Disease Maternal Grandfather        Current Outpatient Medications   Medication Sig Dispense Refill    aspirin 325 MG tablet Take 325 mg by mouth in the morning. Elastic Bandages & Supports (MEDICAL COMPRESSION STOCKINGS) MISC 1 each by Does not apply route daily LUE compression sleeve 20-30 mmhg compression sleeve wear to LUE daily during day and off Qhs. Wear as tolerated. Do not wear if causes increased pain. Wear until LUE symptoms are resolved and/or PRN for comfort. 1 each 5    ibuprofen (ADVIL;MOTRIN) 800 MG tablet       lisinopril (PRINIVIL;ZESTRIL) 10 MG tablet Take 1 tablet by mouth daily 90 tablet 3    norethindrone-ethinyl estradiol (ORTHO-NOVUM 1-35 TAB;NORTREL 1-35 TAB) 1-35 MG-MCG per tablet 1 tablet by mouth daily to be taken continuously. New pack every 21 days. No current facility-administered medications for this visit. Sulfa antibiotics and Amoxicillin    Review of Systems:  Review of Systems   Constitutional:  Negative for activity change, chills, diaphoresis and fever. HENT:  Negative for congestion, ear pain, nosebleeds and rhinorrhea. Eyes:  Negative for redness and visual disturbance. Respiratory:  Negative for apnea, cough, chest tightness, shortness of breath and wheezing. Cardiovascular:  Positive for palpitations. Negative for chest pain and leg swelling. Gastrointestinal:  Negative for abdominal pain, constipation, diarrhea, nausea and vomiting. Genitourinary:  Negative for difficulty urinating and dysuria. Musculoskeletal: Negative. Negative for joint swelling. Skin:  Negative for color change, rash and wound. Neurological:  Negative for dizziness, syncope, weakness, numbness and headaches.    Psychiatric/Behavioral: Negative. VITALS:  Blood pressure 126/76, pulse 87, resp. rate 16, height 5' 5\" (1.651 m), weight 154 lb 12.8 oz (70.2 kg), SpO2 97 %. Body mass index is 25.76 kg/m². Physical Examination:  Physical Exam  Vitals and nursing note reviewed. Constitutional:       Appearance: Normal appearance. HENT:      Head: Normocephalic and atraumatic. Mouth/Throat:      Mouth: Mucous membranes are moist.      Pharynx: Oropharynx is clear. Eyes:      Extraocular Movements: Extraocular movements intact. Conjunctiva/sclera: Conjunctivae normal.      Pupils: Pupils are equal, round, and reactive to light. Cardiovascular:      Rate and Rhythm: Normal rate and regular rhythm. Pulses: Normal pulses. Heart sounds: Normal heart sounds. Pulmonary:      Effort: Pulmonary effort is normal.      Breath sounds: Normal breath sounds. Abdominal:      General: Abdomen is flat. Bowel sounds are normal.      Palpations: Abdomen is soft. Musculoskeletal:         General: Normal range of motion. Cervical back: Normal range of motion and neck supple. Skin:     General: Skin is warm. Neurological:      General: No focal deficit present. Mental Status: She is alert and oriented to person, place, and time. Mental status is at baseline. Psychiatric:         Mood and Affect: Mood normal.      EKG: normal EKG, normal sinus rhythm    No orders of the defined types were placed in this encounter. The 10-year ASCVD risk score (Hodan Jimenez et al., 2013) is: 1.8%    Values used to calculate the score:      Age: 55 years      Sex: Female      Is Non- : No      Diabetic: No      Tobacco smoker: No      Systolic Blood Pressure: 302 mmHg      Is BP treated: Yes      HDL Cholesterol: 49 mg/dL      Total Cholesterol: 236 mg/dL     ASSESSMENT:     Diagnosis Orders   1.  HTN (hypertension) with goal to be determined          PLAN:   Hypertension  Patient is mildly hyperkalemia 7/12/2022 potassium 5  I would like to stop lisinopril  Start chlorthalidone 50mg daily  I will hold off on beta-blockers given the patient reports random episodes of bradycardia in the 30s. I have a suspicion that it is mostly reported and it is due to frequent ectopy the reason why her pulse is reported as bradycardic  TSH within normal limits on 7/2022  Patient was advised and encouraged to check blood pressures at home or at pharmacy daily, maintain a logbook and also call us back if blood pressures are above or below the target ranges. Patient clearly understands and agrees to the instructions. Palpitations  Patient reports that she has been told to her heart rate has been in the 30s. I would like to obtain a 48-hour Holter monitor  Hold off on beta-blockers or any AV dexter blocking agents  I would like to obtain a treadmill stress test to assess for chronotropic response    Return to clinic in 3 months    Recommended patient to eat diets that emphasize high intakes of vegetables, fruits, nuts, whole grains, lean vegetable or animal protein, and fish. As per 2019 ACC/AHA guideline on primary prevention of CVD     Recommended patient to engage in at least 150 minutes per week of accumulated moderate-intensity physical activity or 75 minutes per week of vigorous-intensity physical activity as per 2019 ACC/AHA guideline on primary prevention of CVD         This note was transcribed using voice recognition software. Every effort was made to ensure accuracy; however, inadvertent computerized transcription errors may be present.

## 2022-08-19 ENCOUNTER — HOSPITAL ENCOUNTER (OUTPATIENT)
Dept: NON INVASIVE DIAGNOSTICS | Age: 47
Discharge: HOME | End: 2022-08-19
Payer: COMMERCIAL

## 2022-08-19 DIAGNOSIS — I10 HTN (HYPERTENSION) WITH GOAL TO BE DETERMINED: ICD-10-CM

## 2022-08-19 PROCEDURE — 93225 XTRNL ECG REC<48 HRS REC: CPT

## 2022-08-19 PROCEDURE — 93226 XTRNL ECG REC<48 HR SCAN A/R: CPT

## 2022-08-19 PROCEDURE — 93017 CV STRESS TEST TRACING ONLY: CPT

## 2022-08-19 NOTE — PROGRESS NOTES
Hx,allergies and medications reviewed. Procedure explained and informed consent obtained. Patient took her home medications prior to testing. Tolerated treadmill well for 7:46 minutes. Reached 99 % target HR. SOB noted. Returned to baseline in recovery. Denies chest pain or pressure. EKG shows bigeminy, triplet, couplet, frequent singe pvc's and pac's. Doctor to further review and interpret results. Elizabet Griffiths

## 2022-08-19 NOTE — PROCEDURES
[address]                      [facility], [address] [facility]                              CARDIAC STRESS TEST    PATIENT NAME: Inez Zelaya                      :        1975  MED REC NO:   [id number]                            ROOM:  ACCOUNT NO:   [de-identified]                           ADMIT DATE: 2022  PROVIDER:     Catracho Azul MD    CARDIOVASCULAR DIAGNOSTIC DEPARTMENT    DATE OF STUDY:  2022    GRADED EXERCISE STRESS TEST    INDICATION OF PROCEDURE:  abnormal electrocardiogram.    Underlying electrocardiogram is sinus bradycardia 49 beats per minute,  blood pressure 168/81 with occasional PVCs. The patient was stressed according to standard Octavio protocol. Total  exercise time is 7 minutes and 46 seconds obtaining a peak heart rate  176 beats per minute with a peak blood pressure 170/72 representing 99%  of age maximum predicted heart rate giving her a functional capacity of  22.0 metabolic equivalents. The patient did not have any chest pain during the study. The patient  did have more frequent PVCs in isolation. There are no ventricular  tachycardia. No significant additional erythema arrhythmias noted. No ST-segment  changes noted. IMPRESSION:  1. Average functional capacity for age. 2.  Baseline PVCs with continued PVCs during the study. 3.  No ischemic ST-segment changes noted.         Sadi Lee MD    D: 2022 #17:10:16       T: 2022 17:13:53     [email or web address]  Job#: [id number]     Doc#: [id number]    CC:

## 2022-08-22 ENCOUNTER — TELEPHONE (OUTPATIENT)
Dept: CARDIOLOGY CLINIC | Age: 47
End: 2022-08-22

## 2022-08-22 PROCEDURE — 93018 CV STRESS TEST I&R ONLY: CPT | Performed by: INTERNAL MEDICINE

## 2022-08-22 NOTE — TELEPHONE ENCOUNTER
Pt calling for stress test/ treadmill results. Please call pt once results are in.       Pt # [phone]

## 2022-08-25 NOTE — PROCEDURES
[address]                      [address], [address] [facility]                                 HOLTER MONITOR    PATIENT NAME: Kinjal Abreu                      :        1975  MED REC NO:   [id number]                            ROOM:  ACCOUNT NO:   [de-identified]                           ADMIT DATE: 2022  PROVIDER:     Misty Vela MD      HOLTER MONITOR 48-HOURS    DATE OF STUDY:  2022    ORDERING PHYSICIAN:  Misty Vela MD    INDICATION:  Irregular heart rate. HOLTER MONITOR SUMMARY  ANALYSIS:  This was a 48-hour Holter monitor. Average heart rate 101 beats per minute. Minimum heart rate 60 beats per minute. Maximum heart rate 152 beats per minute. Ventricular ectopy consisted of 85,500 beats; 159 were triplets; 1500  were couplets; 21,000 were single PVCs; eight were interpolated PVCs;  five were R-on-T; 5700 were single VEs; 45,100 were bigeminy; 11,400  were trigeminy. The patient's rhythm included 26 seconds of bradycardia. The slowest  single episode of bradycardia occurred at 04:53 a.m. lasting 8 seconds  at 60 beats per minute. The patient's rhythm included 1 hour and 46 minutes of tachycardia. Fastest single episode of tachycardia occurred at 03:11 p.m. lasting 1  minute and 5 seconds, maximum heart rate 152 beats per minute. Supraventricular ectopy activity consisted of 33 beats of which two were  in atrial couplets, 11 were late beats, 20 were single PACs. Single and longest RR interval 1.3 seconds. CONCLUSION:  1. This 48-hour Holter monitor was significant for the detection of PVC  burden of 29.5%. 2.  There was no V-tach detected. 3.  No atrial fibrillation detected. 4.  Rare supraventricular ectopy. 5.  No diary was returned. 6.  Clinical correlation is advised.         Clarke Escalona MD    D: 2022 9:09:48       T: 2022 9:12:11     [email or web address]  Job#: [id number]     Doc#: [id number]    CC:

## 2022-08-26 NOTE — TELEPHONE ENCOUNTER
Please remind patient that I do not call them unless test results are significantly abnormal.  Otherwise patient needs to wait until I discuss findings and further plan with her during next appointment    Thanks

## 2022-09-19 ENCOUNTER — APPOINTMENT (OUTPATIENT)
Dept: GENERAL RADIOLOGY | Age: 47
End: 2022-09-19
Payer: COMMERCIAL

## 2022-09-19 ENCOUNTER — HOSPITAL ENCOUNTER (EMERGENCY)
Age: 47
Discharge: HOME | End: 2022-09-20
Payer: COMMERCIAL

## 2022-09-19 DIAGNOSIS — R07.9 CHEST PAIN, UNSPECIFIED TYPE: Primary | ICD-10-CM

## 2022-09-19 LAB
ALBUMIN SERPL-MCNC: 4.2 G/DL (ref 3.5–4.6)
ALP BLD-CCNC: 75 U/L (ref 40–130)
ALT SERPL-CCNC: 14 U/L (ref 0–33)
ANION GAP SERPL CALCULATED.3IONS-SCNC: 11 MEQ/L (ref 9–15)
AST SERPL-CCNC: 16 U/L (ref 0–35)
BASOPHILS ABSOLUTE: 0.1 K/UL (ref 0–0.2)
BASOPHILS RELATIVE PERCENT: 0.9 %
BILIRUB SERPL-MCNC: 0.5 MG/DL (ref 0.2–0.7)
BUN BLDV-MCNC: 22 MG/DL (ref 6–20)
CALCIUM SERPL-MCNC: 9.2 MG/DL (ref 8.5–9.9)
CHLORIDE BLD-SCNC: 97 MEQ/L (ref 95–107)
CO2: 29 MEQ/L (ref 20–31)
CREAT SERPL-MCNC: 0.96 MG/DL (ref 0.5–0.9)
EOSINOPHILS ABSOLUTE: 0.1 K/UL (ref 0–0.7)
EOSINOPHILS RELATIVE PERCENT: 1.6 %
GFR AFRICAN AMERICAN: >60
GFR NON-AFRICAN AMERICAN: >60
GLOBULIN: 3.1 G/DL (ref 2.3–3.5)
GLUCOSE BLD-MCNC: 100 MG/DL (ref 70–99)
HCT VFR BLD CALC: 37.6 % (ref 37–47)
HEMOGLOBIN: 13.3 G/DL (ref 12–16)
LYMPHOCYTES ABSOLUTE: 3 K/UL (ref 1–4.8)
LYMPHOCYTES RELATIVE PERCENT: 40.6 %
MCH RBC QN AUTO: 31.1 PG (ref 27–31.3)
MCHC RBC AUTO-ENTMCNC: 35.4 % (ref 33–37)
MCV RBC AUTO: 88.1 FL (ref 82–100)
MONOCYTES ABSOLUTE: 0.6 K/UL (ref 0.2–0.8)
MONOCYTES RELATIVE PERCENT: 7.5 %
NEUTROPHILS ABSOLUTE: 3.7 K/UL (ref 1.4–6.5)
NEUTROPHILS RELATIVE PERCENT: 49.4 %
PDW BLD-RTO: 12.9 % (ref 11.5–14.5)
PLATELET # BLD: 246 K/UL (ref 130–400)
POTASSIUM SERPL-SCNC: 3.3 MEQ/L (ref 3.4–4.9)
RBC # BLD: 4.27 M/UL (ref 4.2–5.4)
SODIUM BLD-SCNC: 137 MEQ/L (ref 135–144)
TOTAL CK: 81 U/L (ref 0–170)
TOTAL PROTEIN: 7.3 G/DL (ref 6.3–8)
TROPONIN: <0.01 NG/ML (ref 0–0.01)
WBC # BLD: 7.5 K/UL (ref 4.8–10.8)

## 2022-09-19 PROCEDURE — 85379 FIBRIN DEGRADATION QUANT: CPT

## 2022-09-19 PROCEDURE — 99285 EMERGENCY DEPT VISIT HI MDM: CPT

## 2022-09-19 PROCEDURE — 93005 ELECTROCARDIOGRAM TRACING: CPT

## 2022-09-19 PROCEDURE — 80053 COMPREHEN METABOLIC PANEL: CPT

## 2022-09-19 PROCEDURE — 71045 X-RAY EXAM CHEST 1 VIEW: CPT

## 2022-09-19 PROCEDURE — 82550 ASSAY OF CK (CPK): CPT

## 2022-09-19 PROCEDURE — 85025 COMPLETE CBC W/AUTO DIFF WBC: CPT

## 2022-09-19 PROCEDURE — 84484 ASSAY OF TROPONIN QUANT: CPT

## 2022-09-19 ASSESSMENT — PAIN DESCRIPTION - FREQUENCY: FREQUENCY: CONTINUOUS

## 2022-09-19 ASSESSMENT — PAIN DESCRIPTION - LOCATION: LOCATION: CHEST

## 2022-09-19 ASSESSMENT — ENCOUNTER SYMPTOMS
ABDOMINAL PAIN: 0
VOMITING: 0
PHOTOPHOBIA: 0
DIARRHEA: 0
SHORTNESS OF BREATH: 0
NAUSEA: 0
COUGH: 0

## 2022-09-19 ASSESSMENT — PAIN - FUNCTIONAL ASSESSMENT: PAIN_FUNCTIONAL_ASSESSMENT: 0-10

## 2022-09-19 ASSESSMENT — PAIN SCALES - GENERAL: PAINLEVEL_OUTOF10: 6

## 2022-09-19 ASSESSMENT — PAIN DESCRIPTION - PAIN TYPE: TYPE: ACUTE PAIN

## 2022-09-19 ASSESSMENT — PAIN DESCRIPTION - DESCRIPTORS: DESCRIPTORS: HEAVINESS;PRESSURE

## 2022-09-20 VITALS
BODY MASS INDEX: 26.52 KG/M2 | WEIGHT: 165 LBS | SYSTOLIC BLOOD PRESSURE: 121 MMHG | DIASTOLIC BLOOD PRESSURE: 69 MMHG | HEART RATE: 96 BPM | HEIGHT: 66 IN | RESPIRATION RATE: 20 BRPM | TEMPERATURE: 97.6 F | OXYGEN SATURATION: 97 %

## 2022-09-20 LAB
D DIMER: 0.32 MG/L FEU (ref 0–0.5)
EKG ATRIAL RATE: 94 BPM
EKG P AXIS: 60 DEGREES
EKG P-R INTERVAL: 124 MS
EKG Q-T INTERVAL: 348 MS
EKG QRS DURATION: 70 MS
EKG QTC CALCULATION (BAZETT): 435 MS
EKG R AXIS: 28 DEGREES
EKG T AXIS: 56 DEGREES
EKG VENTRICULAR RATE: 94 BPM
T4 FREE: 1.1 NG/DL (ref 0.84–1.68)
TSH REFLEX: 8.72 UIU/ML (ref 0.44–3.86)

## 2022-09-20 PROCEDURE — 36415 COLL VENOUS BLD VENIPUNCTURE: CPT

## 2022-09-20 PROCEDURE — 84439 ASSAY OF FREE THYROXINE: CPT

## 2022-09-20 PROCEDURE — 84443 ASSAY THYROID STIM HORMONE: CPT

## 2022-09-20 NOTE — ED PROVIDER NOTES
[address] [facility] ED  eMERGENCY dEPARTMENT eNCOUnter      Pt Name: Roberto Garza  MRN: [id number]  [place] 1975  Date of evaluation: 9/19/2022  Provider: ARIEL Hunt        HISTORY OF PRESENT ILLNESS    Roberto Garza is a 55 y.o. female per chart review has ah/o hypertension, hyperlipidemia, uterine leiomyoma. Patient presents emergency department for ongoing approximately 2 to 3-month history of chest pains, palpitations. She states over the past 2 to 3 days she has felt increasing frequency of episodes. She states she gets intermittent central chest pressures, heart racing sensations. When these episodes come on she is typically at rest, nothing precipitates them. At this time she has associated sweating, nausea, lightheadedness, intermittent blurred vision. She states episodes are typically quick and self-limiting, nothing alleviates them specifically. At present she is not having an episode but she did have one just prior to arrival at work. No CAD history. No CHF history. No smoking. No peripheral edema. No shortness of breath. No fever or infectious upper respiratory complaints. States she has had a superficial left upper extremity blood clot in the past secondary to donating plasma but no deep venous thrombosis history, not on anticoagulation, no recent trauma, surgery, prolonged immobilization or contraceptive use. Chart review notable for patient having an appointment with [facility] cardiology, Dr. Jhony Casarez, approximately 1 month ago for ongoing palpitation episodes and blood pressure management. At that time antihypertensive medications were changed. She was bradycardic thought due to beta-blockers. Hyperkalemic thought due to lisinopril. TSH was within normal limits. She had a treadmill stress test done on 8/19/2022 with noted PVCs but average functional capacity for age and no ischemia or symptoms.   Holter monitor completed around this time noting PVC burden 29.5% without V. tach. No acute follow-up was recommended. REVIEW OF SYSTEMS       Review of Systems   Constitutional:  Negative for chills and fever. HENT:  Negative for congestion. Eyes:  Positive for visual disturbance. Negative for photophobia. Respiratory:  Negative for cough and shortness of breath. Cardiovascular:  Positive for chest pain and palpitations. Negative for leg swelling. Gastrointestinal:  Negative for abdominal pain, diarrhea, nausea and vomiting. Genitourinary:  Negative for difficulty urinating. Musculoskeletal:  Negative for myalgias. Neurological:  Negative for headaches. Psychiatric/Behavioral:  Negative for confusion. Except as noted above the remainder of the review of systems was reviewed and negative. PAST MEDICAL HISTORY     Past Medical History:   Diagnosis Date    HTN (hypertension) with goal to be determined 6/24/2022    Unilateral primary osteoarthritis, left hip 12/21/2018         SURGICAL HISTORY       Past Surgical History:   Procedure Laterality Date    TUBAL LIGATION  2005    UPPER GASTROINTESTINAL ENDOSCOPY  12/01/2017    DR SIGNH         CURRENT MEDICATIONS       Discharge Medication List as of 9/20/2022  1:59 AM        CONTINUE these medications which have NOT CHANGED    Details   chlorthalidone (HYGROTON) 25 MG tablet Take 2 tablets by mouth in the morning., Disp-30 tablet, R-5Normal      aspirin 325 MG tablet Take 325 mg by mouth in the morning. Historical Med      Elastic Bandages & Supports (MEDICAL COMPRESSION STOCKINGS) MISC DAILY Starting Fri 7/29/2022, Disp-1 each, R-5, PrintLUE compression sleeve 20-30 mmhg compression sleeve wear to LUE daily during day and off Qhs. Wear as tolerated. Do not wear if causes increased pain. Wear until LUE symptoms are resolved and/or PRN  for comfort.       ibuprofen (ADVIL;MOTRIN) 800 MG tablet Historical Med      norethindrone-ethinyl estradiol (ORTHO-NOVUM 1-35 TAB;NORTREL 1-35 TAB) 1-35 MG-MCG per tablet 1 tablet by mouth daily to be taken continuously. New pack every 21 days. Historical Med             ALLERGIES     Sulfa antibiotics and Amoxicillin    FAMILY HISTORY       Family History   Problem Relation Age of Onset    High Cholesterol Mother     High Blood Pressure Mother     Diabetes Maternal Grandmother     Heart Disease Maternal Grandmother     Heart Disease Maternal Grandfather           SOCIAL HISTORY       Social History     Socioeconomic History    Marital status: Single     Spouse name: None    Number of children: None    Years of education: None    Highest education level: None   Tobacco Use    Smoking status: Never    Smokeless tobacco: Never   Substance and Sexual Activity    Alcohol use: No     Alcohol/week: 0.0 standard drinks    Drug use: No     Social Determinants of Health     Financial Resource Strain: Low Risk     Difficulty of Paying Living Expenses: Not hard at all   Food Insecurity: No Food Insecurity    Worried About Running Out of Food in the Last Year: Never true    Ran Out of Food in the Last Year: Never true         PHYSICAL EXAM        ED Triage Vitals [09/19/22 2203]   BP Temp Temp Source Heart Rate Resp SpO2 Height Weight   (!) 168/77 97.6 °F (36.4 °C) Temporal (!) 102 18 99 % 5' 6\" (1.676 m) 165 lb (74.8 kg)       Physical Exam  Constitutional:       General: She is not in acute distress. Appearance: Normal appearance. She is not ill-appearing, toxic-appearing or diaphoretic. HENT:      Head: Normocephalic and atraumatic. Right Ear: External ear normal.      Left Ear: External ear normal.      Nose: Nose normal.      Mouth/Throat:      Mouth: Mucous membranes are moist.      Pharynx: Oropharynx is clear. Eyes:      Extraocular Movements: Extraocular movements intact. Cardiovascular:      Rate and Rhythm: Normal rate. Rhythm irregular. Pulmonary:      Effort: Pulmonary effort is normal. No respiratory distress. Breath sounds: Normal breath sounds.  No stridor. No wheezing, rhonchi or rales. Chest:      Chest wall: No tenderness. Abdominal:      General: Bowel sounds are normal. There is no distension. Palpations: Abdomen is soft. There is no mass. Tenderness: There is no abdominal tenderness. There is no guarding or rebound. Hernia: No hernia is present. Musculoskeletal:         General: Normal range of motion. Cervical back: Normal range of motion. Right lower leg: No edema. Left lower leg: No edema. Skin:     General: Skin is warm. Capillary Refill: Capillary refill takes less than 2 seconds. Neurological:      Mental Status: She is alert and oriented to person, place, and time. Psychiatric:         Mood and Affect: Mood normal.         Behavior: Behavior normal.         LABS:  Labs Reviewed   COMPREHENSIVE METABOLIC PANEL - Abnormal; Notable for the following components:       Result Value    Potassium 3.3 (*)     Glucose 100 (*)     BUN 22 (*)     Creatinine 0.96 (*)     All other components within normal limits   TSH WITH REFLEX - Abnormal; Notable for the following components:    TSH 8.720 (*)     All other components within normal limits   CBC WITH AUTO DIFFERENTIAL   TROPONIN   CK   D-DIMER, QUANTITATIVE   T4, FREE     EKG done when patient was in triage was reportedly having an episode at that time, patient and nursing reporting sweating, palpitations    EKG NSR HR 94, PVCs, QT/Qtc 348/435, no acute ischemic changes    MDM:   Vitals:    Vitals:    09/20/22 0005 09/20/22 0010 09/20/22 0035 09/20/22 0203   BP: 124/66  114/61 121/69   Pulse: (!) 103 100 (!) 101 96   Resp: 20 19 21 20   Temp:       TempSrc:       SpO2: 99% 98% 98% 97%   Weight:       Height:           59-year-old female patient to emergency department for increasing frequency of chest pain episodes. Patient with 2-month history of these episodes and is being evaluated in the outpatient setting by cardiology.   She has received a holter monitor,

## 2022-09-20 NOTE — ED NOTES
Patient D/C instructions have been reviewed, patient is to follow up with Cardiology   Patient voiced understanding, no questions or concerns noted at this time.        Rodney [facility]  09/20/22 5148

## 2022-09-20 NOTE — ED TRIAGE NOTES
Pt has co chest pressure and irregular heart rate times two months. Pt states she has been seen and had her follow up with no answers. Pt is aox4 pwd.

## 2022-09-23 ENCOUNTER — TELEPHONE (OUTPATIENT)
Dept: CARDIOLOGY CLINIC | Age: 47
End: 2022-09-23

## 2022-09-23 NOTE — TELEPHONE ENCOUNTER
Per DR Aleksey Mack needs to check BP and HR 2x daily and call Monday to see if any adjustments need made.  Patient aware

## 2022-09-26 RX ORDER — CHLORTHALIDONE 25 MG/1
50 TABLET ORAL DAILY
Qty: 60 TABLET | Refills: 5 | Status: SHIPPED | OUTPATIENT
Start: 2022-09-26

## 2022-11-01 ENCOUNTER — TELEPHONE (OUTPATIENT)
Dept: CARDIOLOGY CLINIC | Age: 47
End: 2022-11-01

## 2022-11-01 DIAGNOSIS — I10 HTN (HYPERTENSION) WITH GOAL TO BE DETERMINED: Primary | ICD-10-CM

## 2022-11-02 NOTE — TELEPHONE ENCOUNTER
Pt has been getting leg cramps during daytime and nighttime. Wants to know if there is something to take to help them? States that she has been drinking more water and Gatorade. She bought OTC medication and ICYHOT for the leg cramps but it did not do anything. States that she thinks its her BP medication, Chlorthalidone. Pt # [phone]   Jose Joy it was ok to leave a .
Spoke to Dr Gabi Marshall, would like BMP ordered. LMOM advising PT and lab ordered.
Yes

## 2022-11-07 DIAGNOSIS — I10 HTN (HYPERTENSION) WITH GOAL TO BE DETERMINED: ICD-10-CM

## 2022-11-07 LAB
ANION GAP SERPL CALCULATED.3IONS-SCNC: 13 MEQ/L (ref 9–15)
BUN BLDV-MCNC: 19 MG/DL (ref 6–20)
CALCIUM SERPL-MCNC: 9.4 MG/DL (ref 8.5–9.9)
CHLORIDE BLD-SCNC: 96 MEQ/L (ref 95–107)
CO2: 28 MEQ/L (ref 20–31)
CREAT SERPL-MCNC: 0.89 MG/DL (ref 0.5–0.9)
GFR SERPL CREATININE-BSD FRML MDRD: >60 ML/MIN/{1.73_M2}
GLUCOSE BLD-MCNC: 78 MG/DL (ref 70–99)
POTASSIUM SERPL-SCNC: 3.7 MEQ/L (ref 3.4–4.9)
SODIUM BLD-SCNC: 137 MEQ/L (ref 135–144)

## 2022-12-02 ENCOUNTER — OFFICE VISIT (OUTPATIENT)
Dept: INTERNAL MEDICINE | Age: 47
End: 2022-12-02
Payer: COMMERCIAL

## 2022-12-02 VITALS
SYSTOLIC BLOOD PRESSURE: 124 MMHG | HEIGHT: 66 IN | WEIGHT: 160 LBS | OXYGEN SATURATION: 98 % | RESPIRATION RATE: 16 BRPM | TEMPERATURE: 97.8 F | DIASTOLIC BLOOD PRESSURE: 64 MMHG | BODY MASS INDEX: 25.71 KG/M2 | HEART RATE: 101 BPM

## 2022-12-02 DIAGNOSIS — I10 PRIMARY HYPERTENSION: Primary | ICD-10-CM

## 2022-12-02 DIAGNOSIS — E78.5 DYSLIPIDEMIA (HIGH LDL; LOW HDL): ICD-10-CM

## 2022-12-02 PROCEDURE — 99214 OFFICE O/P EST MOD 30 MIN: CPT | Performed by: PHYSICIAN ASSISTANT

## 2022-12-02 PROCEDURE — 3078F DIAST BP <80 MM HG: CPT | Performed by: PHYSICIAN ASSISTANT

## 2022-12-02 PROCEDURE — 3074F SYST BP LT 130 MM HG: CPT | Performed by: PHYSICIAN ASSISTANT

## 2022-12-02 RX ORDER — ATORVASTATIN CALCIUM 40 MG/1
40 TABLET, FILM COATED ORAL DAILY
Qty: 90 TABLET | Refills: 3 | Status: SHIPPED | OUTPATIENT
Start: 2022-12-02

## 2022-12-02 RX ORDER — ATORVASTATIN CALCIUM 40 MG/1
40 TABLET, FILM COATED ORAL DAILY
Qty: 90 TABLET | Refills: 3 | Status: SHIPPED | OUTPATIENT
Start: 2022-12-02 | End: 2022-12-02 | Stop reason: SDUPTHER

## 2022-12-02 NOTE — PROGRESS NOTES
Megan Michelle (: 1975) is a 52 y.o. female, Established patient, here for evaluation of the following chief complaint(s):  Discuss Labs        ASSESSMENT/PLAN:  1. Primary hypertension  2. Dyslipidemia (high LDL; low HDL)  - will switch to beta blocker, to controled HR   - will have her monitor at home, and return if needed, repeat labs today   - Lipid Panel; Future  - Hepatic Function Panel; Future  - atorvastatin (LIPITOR) 40 MG tablet; Take 1 tablet by mouth daily  Dispense: 90 tablet; Refill: 3      No follow-ups on file. SUBJECTIVE/OBJECTIVE:  HPI      Pt will a h/o HTN and HLD, here to discuss her labs that were done recently   On Lisinopril   She wants the lipid panel repeated,   since she has made some changes   She has no other concerns      Review of Systems   All other systems reviewed and are negative. Physical Exam  Vitals reviewed. Constitutional:       Appearance: Normal appearance. HENT:      Head: Normocephalic and atraumatic. Cardiovascular:      Rate and Rhythm: Normal rate and regular rhythm. Heart sounds: Normal heart sounds. Pulmonary:      Effort: Pulmonary effort is normal.      Breath sounds: Normal breath sounds. Neurological:      Mental Status: She is alert. Vitals:    22 1413   BP: 124/64   Site: Left Upper Arm   Position: Sitting   Cuff Size: Medium Adult   Pulse: (!) 101   Resp: 16   Temp: 97.8 °F (36.6 °C)   SpO2: 98%   Weight: 160 lb (72.6 kg)   Height: 5' 6\" (1.676 m)                 An electronic signature was used to authenticate this note.     --ARIEL Zavaleta

## 2022-12-07 ENCOUNTER — OFFICE VISIT (OUTPATIENT)
Dept: CARDIOLOGY CLINIC | Age: 47
End: 2022-12-07
Payer: COMMERCIAL

## 2022-12-07 VITALS
HEART RATE: 96 BPM | WEIGHT: 163.2 LBS | OXYGEN SATURATION: 98 % | DIASTOLIC BLOOD PRESSURE: 78 MMHG | SYSTOLIC BLOOD PRESSURE: 132 MMHG | BODY MASS INDEX: 26.34 KG/M2

## 2022-12-07 DIAGNOSIS — I10 HTN (HYPERTENSION) WITH GOAL TO BE DETERMINED: Primary | ICD-10-CM

## 2022-12-07 DIAGNOSIS — I73.9 CLAUDICATION (HCC): ICD-10-CM

## 2022-12-07 DIAGNOSIS — I47.1 SVT (SUPRAVENTRICULAR TACHYCARDIA) (HCC): ICD-10-CM

## 2022-12-07 DIAGNOSIS — E07.9 THYROID DYSFUNCTION: Primary | ICD-10-CM

## 2022-12-07 DIAGNOSIS — I49.3 FREQUENT PVCS: ICD-10-CM

## 2022-12-07 DIAGNOSIS — R94.31 ABNORMAL ECG: ICD-10-CM

## 2022-12-07 PROBLEM — I47.10 SVT (SUPRAVENTRICULAR TACHYCARDIA): Status: ACTIVE | Noted: 2022-12-07

## 2022-12-07 PROCEDURE — 3078F DIAST BP <80 MM HG: CPT | Performed by: INTERNAL MEDICINE

## 2022-12-07 PROCEDURE — 99213 OFFICE O/P EST LOW 20 MIN: CPT | Performed by: INTERNAL MEDICINE

## 2022-12-07 PROCEDURE — 3074F SYST BP LT 130 MM HG: CPT | Performed by: INTERNAL MEDICINE

## 2022-12-07 RX ORDER — LOSARTAN POTASSIUM 25 MG/1
25 TABLET ORAL DAILY
Qty: 180 TABLET | Refills: 5 | Status: SHIPPED | OUTPATIENT
Start: 2022-12-07

## 2022-12-07 ASSESSMENT — ENCOUNTER SYMPTOMS
WHEEZING: 0
ABDOMINAL PAIN: 0
APNEA: 0
NAUSEA: 0
DIARRHEA: 0
CHEST TIGHTNESS: 0
RHINORRHEA: 0
COLOR CHANGE: 0
COUGH: 0
EYE REDNESS: 0
VOMITING: 0
CONSTIPATION: 0
SHORTNESS OF BREATH: 0

## 2022-12-07 NOTE — PROGRESS NOTES
Chief Complaint   Patient presents with    Follow-up     3 month        Patient presents for initial medical evaluation. Patient is followed on a regular basis by ARIEL Thibodeaux. Hypertension  PVC burden 29.5% by Holter 8/2022  48-hour Holter monitor 8/10/2022 29.5% PVCs, no atrial fibrillation, no V. tach no major arrhythmias  Treadmill stress test 8/19/2022. Patient exercised for 7 minutes no ischemia but frequent PVCs before during and after the test  No echocardiograms, no nuclear stress test, no coronary angiograms  =======  12/7/2022  Follow-up on hypertension and palpitations  Patient underwent Holter monitor 8/2022 where patient was found with 29% PVC burden  Treadmill stress test showed frequent PVCs before during and after the test with no ischemia or major arrhythmias  Patient brought in a blood pressure log where blood pressures for the most part are running between 110s to 130s  Heart rates fluctuate between 50s to 100s (likely related to frequent ectopy)  Patient feels palpitations at least once a day along with these palpitations she feels a sensation of hot flash  Along with these palpitations patient also endorses episodes of chest pain  TSH elevated September 2022    8/10/2022  First clinic visit referred to our office for management of hypertension  Patient reports that about 2 months ago she went to donate plasma and she was found with high blood pressure. Patient was started on blood pressure medication which was further increased secondary to blood pressure not being well controlled. Patient was also found bradycardic  Patient reports palpitation episodes for the last 2 months  Patient lives in a one-story house with a basement.   Reports that her bedroom is in the basement and she goes up and down frequently throughout the day with a cardiac issues      Patient Active Problem List   Diagnosis    Dyslipidemia (high LDL; low HDL)    Uterine leiomyoma    Hip impingement syndrome Unilateral primary osteoarthritis, left hip    Unspecified sprain of left hip, initial encounter    HTN (hypertension) with goal to be determined    SVT (supraventricular tachycardia) (HCC)       Past Surgical History:   Procedure Laterality Date    TUBAL LIGATION  2005    UPPER GASTROINTESTINAL ENDOSCOPY  12/01/2017    DR SINGH       Social History     Socioeconomic History    Marital status: Single   Tobacco Use    Smoking status: Never    Smokeless tobacco: Never   Substance and Sexual Activity    Alcohol use: No     Alcohol/week: 0.0 standard drinks    Drug use: No     Social Determinants of Health     Financial Resource Strain: Low Risk     Difficulty of Paying Living Expenses: Not hard at all   Food Insecurity: No Food Insecurity    Worried About Running Out of Food in the Last Year: Never true    Ran Out of Food in the Last Year: Never true       Family History   Problem Relation Age of Onset    High Cholesterol Mother     High Blood Pressure Mother     Diabetes Maternal Grandmother     Heart Disease Maternal Grandmother     Heart Disease Maternal Grandfather        Current Outpatient Medications   Medication Sig Dispense Refill    atorvastatin (LIPITOR) 40 MG tablet Take 1 tablet by mouth daily 90 tablet 3    chlorthalidone (HYGROTON) 25 MG tablet Take 2 tablets by mouth daily 60 tablet 5    norethindrone-ethinyl estradiol (ORTHO-NOVUM 1-35 TAB;NORTREL 1-35 TAB) 1-35 MG-MCG per tablet 1 tablet by mouth daily to be taken continuously. New pack every 21 days. No current facility-administered medications for this visit. Sulfa antibiotics and Amoxicillin    Review of Systems:  Review of Systems   Constitutional:  Negative for activity change, chills, diaphoresis and fever. HENT:  Negative for congestion, ear pain, nosebleeds and rhinorrhea. Eyes:  Negative for redness and visual disturbance. Respiratory:  Negative for apnea, cough, chest tightness, shortness of breath and wheezing. Cardiovascular:  Positive for palpitations. Negative for chest pain and leg swelling. Gastrointestinal:  Negative for abdominal pain, constipation, diarrhea, nausea and vomiting. Genitourinary:  Negative for difficulty urinating and dysuria. Musculoskeletal: Negative. Negative for joint swelling. Skin:  Negative for color change, rash and wound. Neurological:  Negative for dizziness, syncope, weakness, numbness and headaches. Psychiatric/Behavioral: Negative. VITALS:  Blood pressure 132/78, pulse 96, weight 163 lb 3.2 oz (74 kg), SpO2 98 %. Body mass index is 26.34 kg/m². Physical Examination:  Physical Exam  Vitals and nursing note reviewed. Constitutional:       Appearance: Normal appearance. HENT:      Head: Normocephalic and atraumatic. Mouth/Throat:      Mouth: Mucous membranes are moist.      Pharynx: Oropharynx is clear. Eyes:      Extraocular Movements: Extraocular movements intact. Conjunctiva/sclera: Conjunctivae normal.      Pupils: Pupils are equal, round, and reactive to light. Cardiovascular:      Rate and Rhythm: Normal rate and regular rhythm. Pulses: Normal pulses. Heart sounds: Normal heart sounds. Pulmonary:      Effort: Pulmonary effort is normal.      Breath sounds: Normal breath sounds. Abdominal:      General: Abdomen is flat. Bowel sounds are normal.      Palpations: Abdomen is soft. Musculoskeletal:         General: Normal range of motion. Cervical back: Normal range of motion and neck supple. Skin:     General: Skin is warm. Neurological:      General: No focal deficit present. Mental Status: She is alert and oriented to person, place, and time. Mental status is at baseline. Psychiatric:         Mood and Affect: Mood normal.      EKG: normal EKG, normal sinus rhythm    No orders of the defined types were placed in this encounter.       The 10-year ASCVD risk score (Sujey DK, et al., 2019) is: 1.5%    Values used to calculate the score:      Age: 52 years      Sex: Female      Is Non- : No      Diabetic: No      Tobacco smoker: No      Systolic Blood Pressure: 524 mmHg      Is BP treated: No      HDL Cholesterol: 49 mg/dL      Total Cholesterol: 236 mg/dL     ASSESSMENT:     Diagnosis Orders   1. HTN (hypertension) with goal to be determined        2. SVT (supraventricular tachycardia) (HCC)          PLAN:   PVC burden 29%  Patient endorsing episodes of palpitations at least once a day with hot flash sensation and chest pains  I would like to start patient on metoprolol 25 mg p.o. twice daily  TSH elevated September 2022  I would like to refer patient to endocrinology for further management  I would like to obtain an echocardiogram  I would like to refer patient to Dr. Kashmir Warren for further management of these PVCs     Hypertension  patient endorsing episodes of leg cramping which could be related to chlorthalidone. I would like to stop this medication and start losartan 25 mg daily  Patient was advised and encouraged to check blood pressures at home or at pharmacy daily, maintain a logbook and also call us back if blood pressures are above or below the target ranges. Patient clearly understands and agrees to the instructions. Claudication/muscle cramp  I would like to obtain an STEVE    Return to clinic in 3 months    Recommended patient to eat diets that emphasize high intakes of vegetables, fruits, nuts, whole grains, lean vegetable or animal protein, and fish. As per 2019 ACC/AHA guideline on primary prevention of CVD     Recommended patient to engage in at least 150 minutes per week of accumulated moderate-intensity physical activity or 75 minutes per week of vigorous-intensity physical activity as per 2019 ACC/AHA guideline on primary prevention of CVD     This note was transcribed using voice recognition software.   Every effort was made to ensure accuracy; however, inadvertent computerized transcription errors may be present.

## 2022-12-13 ENCOUNTER — HOSPITAL ENCOUNTER (OUTPATIENT)
Dept: ULTRASOUND IMAGING | Age: 47
Discharge: HOME | End: 2022-12-15
Payer: COMMERCIAL

## 2022-12-13 DIAGNOSIS — I73.9 CLAUDICATION (HCC): ICD-10-CM

## 2022-12-13 PROCEDURE — 93924 LWR XTR VASC STDY BILAT: CPT

## 2022-12-14 PROCEDURE — 93924 LWR XTR VASC STDY BILAT: CPT | Performed by: INTERNAL MEDICINE

## 2022-12-29 ENCOUNTER — OFFICE VISIT (OUTPATIENT)
Dept: ENDOCRINOLOGY | Age: 47
End: 2022-12-29
Payer: COMMERCIAL

## 2022-12-29 VITALS
HEIGHT: 66 IN | SYSTOLIC BLOOD PRESSURE: 138 MMHG | DIASTOLIC BLOOD PRESSURE: 84 MMHG | HEART RATE: 98 BPM | WEIGHT: 164 LBS | OXYGEN SATURATION: 98 % | BODY MASS INDEX: 26.36 KG/M2

## 2022-12-29 DIAGNOSIS — E07.9 THYROID DYSFUNCTION: Primary | ICD-10-CM

## 2022-12-29 PROCEDURE — 99203 OFFICE O/P NEW LOW 30 MIN: CPT | Performed by: PHYSICIAN ASSISTANT

## 2022-12-29 PROCEDURE — 3074F SYST BP LT 130 MM HG: CPT | Performed by: PHYSICIAN ASSISTANT

## 2022-12-29 PROCEDURE — 3078F DIAST BP <80 MM HG: CPT | Performed by: PHYSICIAN ASSISTANT

## 2022-12-29 RX ORDER — LEVOTHYROXINE SODIUM 0.05 MG/1
50 TABLET ORAL DAILY
Qty: 90 TABLET | Refills: 3 | Status: SHIPPED | OUTPATIENT
Start: 2022-12-29

## 2022-12-29 ASSESSMENT — ENCOUNTER SYMPTOMS
SHORTNESS OF BREATH: 0
WHEEZING: 0
ABDOMINAL PAIN: 0
RHINORRHEA: 0
DIARRHEA: 0
NAUSEA: 0
VOMITING: 0
SORE THROAT: 0
COUGH: 0
SINUS PRESSURE: 0

## 2022-12-29 NOTE — PROGRESS NOTES
12/29/2022    Assessment:       Diagnosis Orders   1. Thyroid dysfunction  Comprehensive Metabolic Panel    T3, Free    TSH    T4, Free    Thyroid Peroxidase Antibody          PLAN:     Start levothyroxine 50 mcg daily 30 minutes before food or other medications   Repeat labs in 3 months   Follow up in 3 months     Orders Placed This Encounter   Procedures    Comprehensive Metabolic Panel     Standing Status:   Future     Standing Expiration Date:   12/29/2023    T3, Free     Standing Status:   Future     Standing Expiration Date:   12/29/2023    TSH     Standing Status:   Future     Standing Expiration Date:   12/29/2023    T4, Free     Standing Status:   Future     Standing Expiration Date:   12/29/2023    Thyroid Peroxidase Antibody     Standing Status:   Future     Standing Expiration Date:   12/29/2023     Orders Placed This Encounter   Medications    levothyroxine (SYNTHROID) 50 MCG tablet     Sig: Take 1 tablet by mouth daily     Dispense:  90 tablet     Refill:  3     Return in about 3 months (around 3/29/2023) for Thyroid. Subjective:     Chief Complaint   Patient presents with    New Patient    Thyroid Problem     Vitals:    12/29/22 1506 12/29/22 1509   BP: (!) 158/79 138/84   Site: Left Upper Arm Right Upper Arm   Position: Sitting Sitting   Cuff Size: Medium Adult Medium Adult   Pulse: 98    SpO2: 98%    Weight: 164 lb (74.4 kg)    Height: 5' 6\" (1.676 m)      Wt Readings from Last 3 Encounters:   12/29/22 164 lb (74.4 kg)   12/07/22 163 lb 3.2 oz (74 kg)   12/02/22 160 lb (72.6 kg)     BP Readings from Last 3 Encounters:   12/29/22 138/84   12/07/22 132/78   12/02/22 124/64     Patient is a 49-year-old female presents today for evaluation of a hypothyroidism. She been experiencing some fatigue and cold intolerance. Laboratory studies showed a increased TSH. I will start her on levothyroxine 50 mcg daily, repeat labs and follow-up in 3 months. Thyroid Problem  Presents for initial visit.  The condition has lasted for 3 months. Symptoms include fatigue and leg swelling. Patient reports no cold intolerance, diarrhea, heat intolerance, palpitations, weight gain or weight loss. The symptoms have been stable. The following procedures have not been performed: radioiodine uptake scan, thyroid FNA, thyroid ultrasound and thyroidectomy. There is no history of diabetes or Graves' ophthalmopathy.    Past Medical History:   Diagnosis Date    HTN (hypertension) with goal to be determined 6/24/2022    Unilateral primary osteoarthritis, left hip 12/21/2018     Past Surgical History:   Procedure Laterality Date    TUBAL LIGATION  2005    UPPER GASTROINTESTINAL ENDOSCOPY  12/01/2017    DR SINGH     Social History     Socioeconomic History    Marital status: Single     Spouse name: Not on file    Number of children: Not on file    Years of education: Not on file    Highest education level: Not on file   Occupational History    Not on file   Tobacco Use    Smoking status: Never    Smokeless tobacco: Never   Substance and Sexual Activity    Alcohol use: No     Alcohol/week: 0.0 standard drinks    Drug use: No    Sexual activity: Not on file   Other Topics Concern    Not on file   Social History Narrative    Not on file     Social Determinants of Health     Financial Resource Strain: Low Risk     Difficulty of Paying Living Expenses: Not hard at all   Food Insecurity: No Food Insecurity    Worried About Running Out of Food in the Last Year: Never true    Ran Out of Food in the Last Year: Never true   Transportation Needs: Not on file   Physical Activity: Not on file   Stress: Not on file   Social Connections: Not on file   Intimate Partner Violence: Not on file   Housing Stability: Not on file     Family History   Problem Relation Age of Onset    High Cholesterol Mother     High Blood Pressure Mother     Diabetes Maternal Grandmother     Heart Disease Maternal Grandmother     Heart Disease Maternal Grandfather      Allergies Allergen Reactions    Sulfa Antibiotics Hives    Amoxicillin Headaches, Nausea And Vomiting and Swelling       Current Outpatient Medications:     levothyroxine (SYNTHROID) 50 MCG tablet, Take 1 tablet by mouth daily, Disp: 90 tablet, Rfl: 3    metoprolol tartrate (LOPRESSOR) 25 MG tablet, Take 1 tablet by mouth 2 times daily, Disp: 180 tablet, Rfl: 5    losartan (COZAAR) 25 MG tablet, Take 1 tablet by mouth daily, Disp: 180 tablet, Rfl: 5    atorvastatin (LIPITOR) 40 MG tablet, Take 1 tablet by mouth daily, Disp: 90 tablet, Rfl: 3    chlorthalidone (HYGROTON) 25 MG tablet, Take 2 tablets by mouth daily, Disp: 60 tablet, Rfl: 5    norethindrone-ethinyl estradiol (ORTHO-NOVUM 1-35 TAB;NORTREL 1-35 TAB) 1-35 MG-MCG per tablet, 1 tablet by mouth daily to be taken continuously. New pack every 21 days. , Disp: , Rfl:   Lab Results   Component Value Date     11/07/2022    K 3.7 11/07/2022    CL 96 11/07/2022    CO2 28 11/07/2022    BUN 19 11/07/2022    CREATININE 0.89 11/07/2022    GLUCOSE 78 11/07/2022    CALCIUM 9.4 11/07/2022    PROT 7.3 09/19/2022    LABALBU 4.2 09/19/2022    BILITOT 0.5 09/19/2022    ALKPHOS 75 09/19/2022    AST 16 09/19/2022    ALT 14 09/19/2022    LABGLOM >60.0 11/07/2022    GFRAA >60.0 09/19/2022    GLOB 3.1 09/19/2022     Lab Results   Component Value Date    WBC 7.5 09/19/2022    HGB 13.3 09/19/2022    HCT 37.6 09/19/2022    MCV 88.1 09/19/2022     09/19/2022     Lab Results   Component Value Date    LABA1C 5.3 01/08/2020     Lab Results   Component Value Date    HDL 49 07/12/2022    HDL 58 01/08/2020    HDL 47 01/11/2019    HDL 47 01/11/2019    LDLCALC 162 (H) 07/12/2022    LDLCALC 67 01/08/2020    LDLCALC 139 01/12/2018    CHOL 236 (H) 07/12/2022    CHOL 148 01/08/2020    CHOL 208 01/12/2018    TRIG 127 07/12/2022    TRIG 156 01/08/2020    TRIG 90 01/11/2019     No results found for: TESTOSTERONE, SHBG, TESTFREENM  Lab Results   Component Value Date    TSH 3.380 07/12/2022 TSHREFLEX 8.720 (H) 09/20/2022    T4FREE 1.10 09/20/2022     No results found for: TPOABS    Review of Systems   Constitutional:  Positive for fatigue. Negative for chills, fever, weight gain and weight loss. HENT:  Negative for congestion, ear pain, postnasal drip, rhinorrhea, sinus pressure and sore throat. Eyes:  Negative for visual disturbance. Respiratory:  Negative for cough, shortness of breath and wheezing. Cardiovascular:  Negative for chest pain, palpitations and leg swelling. Gastrointestinal:  Negative for abdominal pain, diarrhea, nausea and vomiting. Endocrine: Negative for cold intolerance, heat intolerance, polydipsia and polyuria. Genitourinary:  Negative for difficulty urinating. Musculoskeletal:  Negative for arthralgias. Skin:  Negative for rash. Allergic/Immunologic: Negative for environmental allergies. Neurological:  Negative for dizziness and headaches. Hematological:  Does not bruise/bleed easily. Psychiatric/Behavioral:  Negative for dysphoric mood. Objective:   Physical Exam  Vitals reviewed. Constitutional:       Appearance: Normal appearance. She is well-developed. She is not ill-appearing. HENT:      Head: Normocephalic and atraumatic. Nose: No congestion. Eyes:      Conjunctiva/sclera: Conjunctivae normal.   Neck:      Comments: No thyromegaly or palpable nodules  Cardiovascular:      Rate and Rhythm: Normal rate and regular rhythm. Heart sounds: Normal heart sounds. Pulmonary:      Effort: Pulmonary effort is normal.      Breath sounds: Normal breath sounds. Abdominal:      General: Bowel sounds are normal.      Palpations: Abdomen is soft. Musculoskeletal:         General: Normal range of motion. Cervical back: Normal range of motion and neck supple. Skin:     General: Skin is warm and dry. Neurological:      General: No focal deficit present. Mental Status: She is alert and oriented to person, place, and time. Psychiatric:         Mood and Affect: Mood normal.

## 2022-12-29 NOTE — Clinical Note
Kwasi Finney-thanks for the referral.  I am going to start her on some levothyroxine.  Keena Marie Patients father called to see if the doctor can put in a referral for an ENT. He said they went to urgent care yesterday for strep throat and the patient has gotten it several times this year. Let him know we would call if any questions or else the referral would be put in and a referral specialist would be called to help set up an appointment. You can leave a message.

## 2022-12-29 NOTE — PATIENT INSTRUCTIONS
Start levothyroxine 50 mcg daily 30 minutes before food or other medications   Repeat labs in 3 months   Follow up in 3 months

## 2023-01-05 ENCOUNTER — HOSPITAL ENCOUNTER (OUTPATIENT)
Dept: NON INVASIVE DIAGNOSTICS | Age: 48
Discharge: HOME | End: 2023-01-05
Payer: COMMERCIAL

## 2023-01-05 DIAGNOSIS — R94.31 ABNORMAL ECG: ICD-10-CM

## 2023-01-05 PROCEDURE — 93306 TTE W/DOPPLER COMPLETE: CPT

## 2023-03-08 ENCOUNTER — OFFICE VISIT (OUTPATIENT)
Dept: CARDIOLOGY CLINIC | Age: 48
End: 2023-03-08
Payer: COMMERCIAL

## 2023-03-08 VITALS
HEIGHT: 66 IN | WEIGHT: 168 LBS | BODY MASS INDEX: 27 KG/M2 | SYSTOLIC BLOOD PRESSURE: 120 MMHG | OXYGEN SATURATION: 99 % | DIASTOLIC BLOOD PRESSURE: 84 MMHG | HEART RATE: 84 BPM

## 2023-03-08 DIAGNOSIS — I49.3 PVC (PREMATURE VENTRICULAR CONTRACTION): ICD-10-CM

## 2023-03-08 DIAGNOSIS — I47.1 SVT (SUPRAVENTRICULAR TACHYCARDIA) (HCC): Primary | ICD-10-CM

## 2023-03-08 DIAGNOSIS — I10 HTN (HYPERTENSION) WITH GOAL TO BE DETERMINED: ICD-10-CM

## 2023-03-08 PROCEDURE — 3074F SYST BP LT 130 MM HG: CPT | Performed by: INTERNAL MEDICINE

## 2023-03-08 PROCEDURE — 3079F DIAST BP 80-89 MM HG: CPT | Performed by: INTERNAL MEDICINE

## 2023-03-08 PROCEDURE — 99213 OFFICE O/P EST LOW 20 MIN: CPT | Performed by: INTERNAL MEDICINE

## 2023-03-08 RX ORDER — ATENOLOL 25 MG/1
TABLET ORAL
COMMUNITY
Start: 2023-02-16 | End: 2023-03-08

## 2023-03-08 RX ORDER — NORETHINDRONE AND ETHINYL ESTRADIOL 0.5-0.035
KIT ORAL
COMMUNITY
Start: 2023-02-19

## 2023-03-08 RX ORDER — METOPROLOL TARTRATE 50 MG/1
50 TABLET, FILM COATED ORAL 2 TIMES DAILY
Qty: 180 TABLET | Refills: 1 | Status: SHIPPED | OUTPATIENT
Start: 2023-03-08

## 2023-03-08 ASSESSMENT — ENCOUNTER SYMPTOMS
CONSTIPATION: 0
NAUSEA: 0
EYE REDNESS: 0
VOMITING: 0
ABDOMINAL PAIN: 0
SHORTNESS OF BREATH: 0
WHEEZING: 0
DIARRHEA: 0
COUGH: 0
RHINORRHEA: 0
APNEA: 0
COLOR CHANGE: 0
CHEST TIGHTNESS: 0

## 2023-03-08 NOTE — PROGRESS NOTES
Chief Complaint   Patient presents with    Results    Follow-up       Patient presents for initial medical evaluation. Patient is followed on a regular basis by [facility] Armas As[facility]. Hypertension  PVC burden 29.5% by Holter 8/2022  48-hour Holter monitor 8/10/2022 29.5% PVCs, no atrial fibrillation, no V. tach no major arrhythmias  Treadmill stress test 8/19/2022. Patient exercised for 7 minutes no ischemia but frequent PVCs before during and after the test  TTE 1/5/2023 EF 60%  Arterial ultrasound 12/13/2022 no stenosis bilaterally  =======  3/8/2023  Follow-up on palpitations  Patient reports that she saw Dr. Angela Forte who prescribed atenolol for her PVC management  States that she still trying to get used to this medication since when she takes it she feels a little bit woozy  Palpitations are still the same as before starting beta-blockers  Patient endorsing episodes of chest tightness which are associated with the palpitations  Denies shortness of breath  TTE 1/5/2023 EF 60%    12/7/2022  Follow-up on hypertension and palpitations  Patient underwent Holter monitor 8/2022 where patient was found with 29% PVC burden  Treadmill stress test showed frequent PVCs before during and after the test with no ischemia or major arrhythmias  Patient brought in a blood pressure log where blood pressures for the most part are running between 110s to 130s  Heart rates fluctuate between 50s to 100s (likely related to frequent ectopy)  Patient feels palpitations at least once a day along with these palpitations she feels a sensation of hot flash  Along with these palpitations patient also endorses episodes of chest pain  TSH elevated September 2022    8/10/2022  First clinic visit referred to our office for management of hypertension  Patient reports that about 2 months ago she went to donate plasma and she was found with high blood pressure.   Patient was started on blood pressure medication which was further increased secondary to blood pressure not being well controlled. Patient was also found bradycardic  Patient reports palpitation episodes for the last 2 months  Patient lives in a one-story house with a basement. Reports that her bedroom is in the basement and she goes up and down frequently throughout the day with a cardiac issues      Patient Active Problem List   Diagnosis    Dyslipidemia (high LDL; low HDL)    Uterine leiomyoma    Hip impingement syndrome    Unilateral primary osteoarthritis, left hip    Unspecified sprain of left hip, initial encounter    HTN (hypertension) with goal to be determined    SVT (supraventricular tachycardia) ([facility] [address]       Past Surgical History:   Procedure Laterality Date    TUBAL LIGATION  2005    UPPER GASTROINTESTINAL ENDOSCOPY  12/01/2017    DR SINGH       Social History     Socioeconomic History    Marital status: Single     Spouse name: None    Number of children: None    Years of education: None    Highest education level: None   Tobacco Use    Smoking status: Never    Smokeless tobacco: Never   Substance and Sexual Activity    Alcohol use: No     Alcohol/week: 0.0 standard drinks    Drug use: No     Social Determinants of Health     Financial Resource Strain: Low Risk     Difficulty of Paying Living Expenses: Not hard at all   Food Insecurity: No Food Insecurity    Worried About Running Out of Food in the Last Year: Never true    Ran Out of Food in the Last Year: Never true       Family History   Problem Relation Age of Onset    High Cholesterol Mother     High Blood Pressure Mother     Diabetes Maternal Grandmother     Heart Disease Maternal Grandmother     Heart Disease Maternal Grandfather        Current Outpatient Medications   Medication Sig Dispense Refill    atenolol (TENORMIN) 25 MG tablet TAKE 1 TABLET BY MOUTH EVERYDAY AT BEDTIME      NORTREL 0.5/35, 28, 0.5-35 MG-MCG per tablet TAKE 1 TABLET BY MOUTH ONCE DAILY.  NEW PACK EVER 21 DAYS      levothyroxine (SYNTHROID) 50 MCG tablet Take 1 tablet by mouth daily 90 tablet 3    losartan (COZAAR) 25 MG tablet Take 1 tablet by mouth daily 180 tablet 5    atorvastatin (LIPITOR) 40 MG tablet Take 1 tablet by mouth daily 90 tablet 3    chlorthalidone (HYGROTON) 25 MG tablet Take 2 tablets by mouth daily 60 tablet 5    metoprolol tartrate (LOPRESSOR) 25 MG tablet Take 1 tablet by mouth 2 times daily (Patient not taking: Reported on 3/8/2023) 180 tablet 5     No current facility-administered medications for this visit. Sulfa antibiotics and Amoxicillin    Review of Systems:  Review of Systems   Constitutional:  Negative for activity change, chills, diaphoresis and fever. HENT:  Negative for congestion, ear pain, nosebleeds and rhinorrhea. Eyes:  Negative for redness and visual disturbance. Respiratory:  Negative for apnea, cough, chest tightness, shortness of breath and wheezing. Cardiovascular:  Positive for palpitations. Negative for chest pain and leg swelling. Gastrointestinal:  Negative for abdominal pain, constipation, diarrhea, nausea and vomiting. Genitourinary:  Negative for difficulty urinating and dysuria. Musculoskeletal: Negative. Negative for joint swelling. Skin:  Negative for color change, rash and wound. Neurological:  Negative for dizziness, syncope, weakness, numbness and headaches. Psychiatric/Behavioral: Negative. VITALS:  Blood pressure 120/84, pulse 84, height 5' 6\" (1.676 m), weight 168 lb (76.2 kg), SpO2 99 %. Body mass index is 27.12 kg/m². Physical Examination:  Physical Exam  Vitals and nursing note reviewed. Constitutional:       Appearance: Normal appearance. HENT:      Head: Normocephalic and atraumatic. Mouth/Throat:      Mouth: Mucous membranes are moist.      Pharynx: Oropharynx is clear. Eyes:      Extraocular Movements: Extraocular movements intact. Conjunctiva/sclera: Conjunctivae normal.      Pupils: Pupils are equal, round, and reactive to light. Cardiovascular:      Rate and Rhythm: Normal rate and regular rhythm. Pulses: Normal pulses. Heart sounds: Normal heart sounds. Pulmonary:      Effort: Pulmonary effort is normal.      Breath sounds: Normal breath sounds. Abdominal:      General: Abdomen is flat. Bowel sounds are normal.      Palpations: Abdomen is soft. Musculoskeletal:         General: Normal range of motion. Cervical back: Normal range of motion and neck supple. Skin:     General: Skin is warm. Neurological:      General: No focal deficit present. Mental Status: She is alert and oriented to person, place, and time. Mental status is at baseline. Psychiatric:         Mood and Affect: Mood normal.      EKG: normal EKG, normal sinus rhythm    No orders of the defined types were placed in this encounter. The 10-year ASCVD risk score (Sujey POTTS, et al., 2019) is: 1.7%    Values used to calculate the score:      Age: 52 years      Sex: Female      Is Non- : No      Diabetic: No      Tobacco smoker: No      Systolic Blood Pressure: 346 mmHg      Is BP treated: Yes      HDL Cholesterol: 49 mg/dL      Total Cholesterol: 236 mg/dL     ASSESSMENT:     Diagnosis Orders   1. SVT (supraventricular tachycardia) ([facility] [address]        2. HTN (hypertension) with goal to be determined        3. PVC (premature ventricular contraction)          PLAN:   PVC burden 29%  Patient endorsing episodes of palpitations at least once a day with hot flash sensation and chest pains  Initially I had placed patient on metoprolol 25 mg p.o. twice daily, patient was tolerating this medication well. However Dr. Isabelle Roman stopped it and started atenolol  Unfortunately patient is experiencing some side effects of atenolol 25 mg daily. I would like to restart metoprolol at a higher dose 50 mg p.o. twice daily  . I will notify Dr. Isabelle oRman about this change.   And refer patient back to see him in clinic  TSH elevated September 2022  I would like to obtain a 48-hour Holter monitor  Patient has a smart watch which is alerting her that she has atrial fibrillation. I review the tracings and they are not atrial fibrillation, it is indeed sinus rhythm with PVCs    Hypertension  Chlorthalidone. This medication was stopped secondary to leg cramping  Continue losartan 25 mg daily  Patient was advised and encouraged to check blood pressures at home or at pharmacy daily, maintain a logbook and also call us back if blood pressures are above or below the target ranges. Patient clearly understands and agrees to the instructions. Claudication/muscle cramp  Arterial ultrasound 12/13/2022 no stenosis bilaterally    Return to clinic in 6 months    Recommended patient to eat diets that emphasize high intakes of vegetables, fruits, nuts, whole grains, lean vegetable or animal protein, and fish. As per 2019 ACC/AHA guideline on primary prevention of CVD     Recommended patient to engage in at least 150 minutes per week of accumulated moderate-intensity physical activity or 75 minutes per week of vigorous-intensity physical activity as per 2019 ACC/AHA guideline on primary prevention of CVD     This note was transcribed using voice recognition software. Every effort was made to ensure accuracy; however, inadvertent computerized transcription errors may be present.

## 2023-03-21 DIAGNOSIS — E07.9 THYROID DYSFUNCTION: ICD-10-CM

## 2023-03-21 DIAGNOSIS — E78.5 DYSLIPIDEMIA (HIGH LDL; LOW HDL): ICD-10-CM

## 2023-03-21 LAB
ALBUMIN SERPL-MCNC: 3.6 G/DL (ref 3.5–4.6)
ALBUMIN SERPL-MCNC: 3.8 G/DL (ref 3.5–4.6)
ALP SERPL-CCNC: 66 U/L (ref 40–130)
ALP SERPL-CCNC: 67 U/L (ref 40–130)
ALT SERPL-CCNC: 14 U/L (ref 0–33)
ALT SERPL-CCNC: 14 U/L (ref 0–33)
ANION GAP SERPL CALCULATED.3IONS-SCNC: 10 MEQ/L (ref 9–15)
AST SERPL-CCNC: 16 U/L (ref 0–35)
AST SERPL-CCNC: 16 U/L (ref 0–35)
BILIRUB DIRECT SERPL-MCNC: <0.2 MG/DL (ref 0–0.4)
BILIRUB INDIRECT SERPL-MCNC: ABNORMAL MG/DL (ref 0–0.6)
BILIRUB SERPL-MCNC: 1.1 MG/DL (ref 0.2–0.7)
BILIRUB SERPL-MCNC: 1.1 MG/DL (ref 0.2–0.7)
BUN SERPL-MCNC: 14 MG/DL (ref 6–20)
CALCIUM SERPL-MCNC: 9 MG/DL (ref 8.5–9.9)
CHLORIDE SERPL-SCNC: 107 MEQ/L (ref 95–107)
CHOLEST SERPL-MCNC: 116 MG/DL (ref 0–199)
CO2 SERPL-SCNC: 25 MEQ/L (ref 20–31)
CREAT SERPL-MCNC: 0.85 MG/DL (ref 0.5–0.9)
GLOBULIN SER CALC-MCNC: 2.8 G/DL (ref 2.3–3.5)
GLUCOSE SERPL-MCNC: 76 MG/DL (ref 70–99)
HDLC SERPL-MCNC: 44 MG/DL (ref 40–59)
LDLC SERPL CALC-MCNC: 49 MG/DL (ref 0–129)
POTASSIUM SERPL-SCNC: 4.5 MEQ/L (ref 3.4–4.9)
PROT SERPL-MCNC: 6.4 G/DL (ref 6.3–8)
PROT SERPL-MCNC: 6.5 G/DL (ref 6.3–8)
SODIUM SERPL-SCNC: 142 MEQ/L (ref 135–144)
T4 FREE SERPL-MCNC: 1.12 NG/DL (ref 0.84–1.68)
TRIGL SERPL-MCNC: 115 MG/DL (ref 0–150)
TSH SERPL-MCNC: 1.55 UIU/ML (ref 0.44–3.86)

## 2023-03-22 LAB
T3 FREE: 2.76 PG/ML (ref 2.02–4.43)
THYROPEROXIDASE IGG SERPL-ACNC: <4 IU/ML (ref 0–25)

## 2023-05-16 ENCOUNTER — OFFICE VISIT (OUTPATIENT)
Dept: INTERNAL MEDICINE | Age: 48
End: 2023-05-16
Payer: COMMERCIAL

## 2023-05-16 VITALS
HEART RATE: 67 BPM | BODY MASS INDEX: 24.59 KG/M2 | OXYGEN SATURATION: 98 % | TEMPERATURE: 97.8 F | RESPIRATION RATE: 16 BRPM | HEIGHT: 66 IN | SYSTOLIC BLOOD PRESSURE: 138 MMHG | DIASTOLIC BLOOD PRESSURE: 84 MMHG | WEIGHT: 153 LBS

## 2023-05-16 DIAGNOSIS — F43.23 ADJUSTMENT DISORDER WITH MIXED ANXIETY AND DEPRESSED MOOD: Primary | ICD-10-CM

## 2023-05-16 PROCEDURE — 3079F DIAST BP 80-89 MM HG: CPT | Performed by: PHYSICIAN ASSISTANT

## 2023-05-16 PROCEDURE — 99213 OFFICE O/P EST LOW 20 MIN: CPT | Performed by: PHYSICIAN ASSISTANT

## 2023-05-16 PROCEDURE — 3075F SYST BP GE 130 - 139MM HG: CPT | Performed by: PHYSICIAN ASSISTANT

## 2023-05-16 RX ORDER — TRAZODONE HYDROCHLORIDE 50 MG/1
50 TABLET ORAL NIGHTLY
Qty: 30 TABLET | Refills: 0 | Status: SHIPPED | OUTPATIENT
Start: 2023-05-16

## 2023-05-16 RX ORDER — FLUOXETINE HYDROCHLORIDE 20 MG/1
20 CAPSULE ORAL DAILY
Qty: 30 CAPSULE | Refills: 0 | Status: SHIPPED | OUTPATIENT
Start: 2023-05-16

## 2023-05-16 SDOH — ECONOMIC STABILITY: FOOD INSECURITY: WITHIN THE PAST 12 MONTHS, YOU WORRIED THAT YOUR FOOD WOULD RUN OUT BEFORE YOU GOT MONEY TO BUY MORE.: SOMETIMES TRUE

## 2023-05-16 SDOH — ECONOMIC STABILITY: HOUSING INSECURITY
IN THE LAST 12 MONTHS, WAS THERE A TIME WHEN YOU DID NOT HAVE A STEADY PLACE TO SLEEP OR SLEPT IN A SHELTER (INCLUDING NOW)?: NO

## 2023-05-16 SDOH — ECONOMIC STABILITY: INCOME INSECURITY: HOW HARD IS IT FOR YOU TO PAY FOR THE VERY BASICS LIKE FOOD, HOUSING, MEDICAL CARE, AND HEATING?: HARD

## 2023-05-16 SDOH — ECONOMIC STABILITY: FOOD INSECURITY: WITHIN THE PAST 12 MONTHS, THE FOOD YOU BOUGHT JUST DIDN'T LAST AND YOU DIDN'T HAVE MONEY TO GET MORE.: NEVER TRUE

## 2023-05-16 ASSESSMENT — PATIENT HEALTH QUESTIONNAIRE - PHQ9
SUM OF ALL RESPONSES TO PHQ QUESTIONS 1-9: 2
SUM OF ALL RESPONSES TO PHQ QUESTIONS 1-9: 2
2. FEELING DOWN, DEPRESSED OR HOPELESS: 1
SUM OF ALL RESPONSES TO PHQ9 QUESTIONS 1 & 2: 2
SUM OF ALL RESPONSES TO PHQ QUESTIONS 1-9: 2
SUM OF ALL RESPONSES TO PHQ QUESTIONS 1-9: 2
1. LITTLE INTEREST OR PLEASURE IN DOING THINGS: 1

## 2023-05-16 NOTE — PROGRESS NOTES
Jacob Catherine (: 1975) is a 52 y.o. female, Established patient, here for evaluation of the following chief complaint(s):  Mental Health Problem (Pt would like to discuss her mental health problems. Started around the end of march. Pt stopped taking her medications at the beginning of April. She convinced herself that she didn't need them, but she did start taking them again)        ASSESSMENT/PLAN:  1. Adjustment disorder with mixed anxiety and depressed mood  - will start prozac and Trazodone  - FLUoxetine (PROZAC) 20 MG capsule; Take 1 capsule by mouth daily  Dispense: 30 capsule; Refill: 0  - traZODone (DESYREL) 50 MG tablet; Take 1 tablet by mouth nightly  Dispense: 30 tablet; Refill: 0  - she is starting therapy   - will see her back in 3 weeks, sooner if needed       Return in about 3 weeks (around 2023), or anxiety and depression. SUBJECTIVE/OBJECTIVE:  Mental Health Problem    Mood issues since March , when she lost her overtime pay   She could not longer afford the medications and expensive food for her dog   Then she began to worry about the entire economy and that times were going to crash   She plans to do therapy, her insurance gave her someone's name that is covered. Review of Systems   Psychiatric/Behavioral:  Positive for decreased concentration and sleep disturbance. The patient is nervous/anxious. All other systems reviewed and are negative. Physical Exam  Vitals reviewed. Constitutional:       Appearance: Normal appearance. Cardiovascular:      Rate and Rhythm: Normal rate and regular rhythm. Heart sounds: Normal heart sounds. Pulmonary:      Effort: Pulmonary effort is normal.      Breath sounds: Normal breath sounds. Neurological:      General: No focal deficit present. Mental Status: She is alert. Psychiatric:         Mood and Affect: Mood normal. Affect is tearful. Thought Content:  Thought content normal.       Vitals:

## 2023-05-26 ENCOUNTER — OFFICE VISIT (OUTPATIENT)
Dept: ENDOCRINOLOGY | Age: 48
End: 2023-05-26
Payer: COMMERCIAL

## 2023-05-26 VITALS
DIASTOLIC BLOOD PRESSURE: 68 MMHG | HEIGHT: 66 IN | OXYGEN SATURATION: 98 % | BODY MASS INDEX: 25.39 KG/M2 | WEIGHT: 158 LBS | HEART RATE: 72 BPM | SYSTOLIC BLOOD PRESSURE: 118 MMHG

## 2023-05-26 DIAGNOSIS — E03.8 OTHER SPECIFIED HYPOTHYROIDISM: ICD-10-CM

## 2023-05-26 PROCEDURE — 3078F DIAST BP <80 MM HG: CPT | Performed by: PHYSICIAN ASSISTANT

## 2023-05-26 PROCEDURE — 99214 OFFICE O/P EST MOD 30 MIN: CPT | Performed by: PHYSICIAN ASSISTANT

## 2023-05-26 PROCEDURE — 3074F SYST BP LT 130 MM HG: CPT | Performed by: PHYSICIAN ASSISTANT

## 2023-05-26 RX ORDER — ATENOLOL 25 MG/1
TABLET ORAL
COMMUNITY
Start: 2023-05-16

## 2023-05-26 ASSESSMENT — ENCOUNTER SYMPTOMS
NAUSEA: 0
DIARRHEA: 0
SORE THROAT: 0
VOMITING: 0
SHORTNESS OF BREATH: 0
COUGH: 0
ABDOMINAL PAIN: 0
SINUS PRESSURE: 0
RHINORRHEA: 0
WHEEZING: 0

## 2023-05-26 NOTE — PATIENT INSTRUCTIONS
Continue levothyroxine 50 mcg daily 30 minutes before food or other medications   Repeat labs in 6 months   Follow up in 6 months

## 2023-05-26 NOTE — PROGRESS NOTES
5/26/2023    Assessment:       Diagnosis Orders   1. Other specified hypothyroidism  T4, Free    TSH          PLAN:     Continue levothyroxine 50 mcg daily 30 minutes before food or other medications   Repeat labs in 6 months   Follow up in 6 months     Orders Placed This Encounter   Procedures    T4, Free     Standing Status:   Future     Standing Expiration Date:   5/26/2024    TSH     Standing Status:   Future     Standing Expiration Date:   5/26/2024     No orders of the defined types were placed in this encounter. Return in about 6 months (around 11/26/2023). Subjective:     Chief Complaint   Patient presents with    Thyroid Problem    Follow-up     Vitals:    05/26/23 0834   BP: 118/68   Site: Left Upper Arm   Pulse: 72   SpO2: 98%   Weight: 158 lb (71.7 kg)   Height: 5' 6\" (1.676 m)     Wt Readings from Last 3 Encounters:   05/26/23 158 lb (71.7 kg)   05/16/23 153 lb (69.4 kg)   03/08/23 168 lb (76.2 kg)     BP Readings from Last 3 Encounters:   05/26/23 118/68   05/16/23 138/84   03/08/23 120/84     Patient is a 42-year-old female presents today for evaluation of a hypothyroidism. She been experiencing some fatigue and cold intolerance. Laboratory studies showed a increased TSH. I prescribed levothyroxine 50 mcg by mouth daily. Repeat laboratory studies showed a normalized TSH and free T4. Her fatigue has improved however she still has her cold intolerance. We will continue medications as prescribed and repeat labs and follow-up in 6 months. Thyroid Problem  Presents for initial visit. The condition has lasted for 3 months. Symptoms include fatigue and leg swelling. Patient reports no cold intolerance, diarrhea, heat intolerance, palpitations, weight gain or weight loss. The symptoms have been stable. The following procedures have not been performed: radioiodine uptake scan, thyroid FNA, thyroid ultrasound and thyroidectomy. There is no history of diabetes or Graves' ophthalmopathy.    Past

## 2023-06-05 ENCOUNTER — OFFICE VISIT (OUTPATIENT)
Dept: INTERNAL MEDICINE | Age: 48
End: 2023-06-05
Payer: COMMERCIAL

## 2023-06-05 VITALS
WEIGHT: 157 LBS | RESPIRATION RATE: 16 BRPM | BODY MASS INDEX: 25.23 KG/M2 | HEIGHT: 66 IN | SYSTOLIC BLOOD PRESSURE: 118 MMHG | HEART RATE: 68 BPM | TEMPERATURE: 97.3 F | DIASTOLIC BLOOD PRESSURE: 72 MMHG | OXYGEN SATURATION: 98 %

## 2023-06-05 DIAGNOSIS — F43.23 ADJUSTMENT DISORDER WITH MIXED ANXIETY AND DEPRESSED MOOD: Primary | ICD-10-CM

## 2023-06-05 PROCEDURE — 3074F SYST BP LT 130 MM HG: CPT | Performed by: PHYSICIAN ASSISTANT

## 2023-06-05 PROCEDURE — 99213 OFFICE O/P EST LOW 20 MIN: CPT | Performed by: PHYSICIAN ASSISTANT

## 2023-06-05 PROCEDURE — 3078F DIAST BP <80 MM HG: CPT | Performed by: PHYSICIAN ASSISTANT

## 2023-06-05 RX ORDER — FLUOXETINE HYDROCHLORIDE 40 MG/1
40 CAPSULE ORAL DAILY
Qty: 90 CAPSULE | Refills: 3 | Status: SHIPPED | OUTPATIENT
Start: 2023-06-05

## 2023-06-05 ASSESSMENT — ENCOUNTER SYMPTOMS: RESPIRATORY NEGATIVE: 1

## 2023-06-05 NOTE — PROGRESS NOTES
Isis Adorno (: 1975) is a 52 y.o. female, Established patient, here for evaluation of the following chief complaint(s): Anxiety (3 week follow up/Pt works 2 jobs and last week she didn't go to either job. She's not crying anymore, but wants to freak out about everything. She thinks the medication is working but it's not strong enough) and Depression (3 week follow up/)        ASSESSMENT/PLAN:  1. Adjustment disorder with mixed anxiety and depressed mood  - will increase the dose, she will let me know if it doesn't help   - moving the dose to evenings    - FLUoxetine (PROZAC) 40 MG capsule; Take 1 capsule by mouth daily  Dispense: 90 capsule; Refill: 3        No follow-ups on file. SUBJECTIVE/OBJECTIVE:  HPI    Three week follow up after starting  fluoxetine (Prozac) for MITCHELL and depression   Patient is here for 3 week follow up after beginning the new medication. She states new symptoms : none. She denies constipation, diarrhea, dizziness, drowsiness, dry mouth, headache, nausea, nervousness, sexual dysfunction, and weight gain. Patient is happy with the results so far , but does not feel that it is strong enough , and she is tired all the time        Review of Systems   Respiratory: Negative. Psychiatric/Behavioral:  Positive for dysphoric mood. The patient is nervous/anxious. Physical Exam  Vitals reviewed. Constitutional:       Appearance: Normal appearance. HENT:      Head: Normocephalic. Cardiovascular:      Rate and Rhythm: Regular rhythm. Heart sounds: Normal heart sounds. Pulmonary:      Effort: Pulmonary effort is normal.      Breath sounds: Normal breath sounds. Neurological:      Mental Status: She is alert. Psychiatric:         Thought Content:  Thought content normal.       Vitals:    23 0718   BP: 118/72   Site: Left Upper Arm   Position: Sitting   Cuff Size: Medium Adult   Pulse: 68   Resp: 16   Temp: 97.3 °F (36.3 °C)   SpO2: 98%   Weight: 157 lb

## 2023-07-24 DIAGNOSIS — I49.9 IRREGULAR HEART BEAT: Primary | ICD-10-CM

## 2023-07-27 ENCOUNTER — OFFICE VISIT (OUTPATIENT)
Dept: INTERNAL MEDICINE | Age: 48
End: 2023-07-27
Payer: COMMERCIAL

## 2023-07-27 VITALS
HEIGHT: 66 IN | RESPIRATION RATE: 16 BRPM | WEIGHT: 157 LBS | TEMPERATURE: 97.6 F | HEART RATE: 61 BPM | OXYGEN SATURATION: 97 % | BODY MASS INDEX: 25.23 KG/M2 | SYSTOLIC BLOOD PRESSURE: 138 MMHG | DIASTOLIC BLOOD PRESSURE: 80 MMHG

## 2023-07-27 DIAGNOSIS — R51.9 HEADACHE BEHIND THE EYES: Primary | ICD-10-CM

## 2023-07-27 PROCEDURE — 3074F SYST BP LT 130 MM HG: CPT | Performed by: PHYSICIAN ASSISTANT

## 2023-07-27 PROCEDURE — 3078F DIAST BP <80 MM HG: CPT | Performed by: PHYSICIAN ASSISTANT

## 2023-07-27 PROCEDURE — 99213 OFFICE O/P EST LOW 20 MIN: CPT | Performed by: PHYSICIAN ASSISTANT

## 2023-07-27 RX ORDER — SUMATRIPTAN 50 MG/1
TABLET, FILM COATED ORAL
Qty: 9 TABLET | Refills: 3 | Status: SHIPPED | OUTPATIENT
Start: 2023-07-27

## 2023-07-28 ENCOUNTER — HOSPITAL ENCOUNTER (OUTPATIENT)
Age: 48
Discharge: HOME | End: 2023-07-30

## 2023-07-28 DIAGNOSIS — I49.9 IRREGULAR HEART BEAT: ICD-10-CM

## 2023-08-01 NOTE — TELEPHONE ENCOUNTER
Please approve or deny this refill request. The order is pended. Thank you.     LOV 3/8/2023    Next Visit Date:  Future Appointments   Date Time Provider [address] [place] [address]   9/20/2023  8:30 AM Izaiah Desai MD [address] [place] [address]   11/17/2023  9:30 AM Claribel Mazariegos, [address] [place] [address]

## 2023-08-03 ASSESSMENT — ENCOUNTER SYMPTOMS: RESPIRATORY NEGATIVE: 1

## 2023-08-06 RX ORDER — METOPROLOL TARTRATE 50 MG/1
TABLET, FILM COATED ORAL
Qty: 180 TABLET | Refills: 3 | Status: SHIPPED | OUTPATIENT
Start: 2023-08-06

## 2023-08-24 ENCOUNTER — OFFICE VISIT (OUTPATIENT)
Dept: INTERNAL MEDICINE | Age: 48
End: 2023-08-24
Payer: COMMERCIAL

## 2023-08-24 VITALS
HEIGHT: 66 IN | SYSTOLIC BLOOD PRESSURE: 122 MMHG | BODY MASS INDEX: 24.91 KG/M2 | HEART RATE: 78 BPM | DIASTOLIC BLOOD PRESSURE: 76 MMHG | OXYGEN SATURATION: 98 % | WEIGHT: 155 LBS | TEMPERATURE: 98.4 F

## 2023-08-24 DIAGNOSIS — R53.83 OTHER FATIGUE: ICD-10-CM

## 2023-08-24 DIAGNOSIS — K59.1 FUNCTIONAL DIARRHEA: Primary | ICD-10-CM

## 2023-08-24 LAB
BASOPHILS # BLD: 0.1 K/UL (ref 0–0.2)
BASOPHILS NFR BLD: 0.8 %
EOSINOPHIL # BLD: 0.1 K/UL (ref 0–0.7)
EOSINOPHIL NFR BLD: 1.5 %
ERYTHROCYTE [DISTWIDTH] IN BLOOD BY AUTOMATED COUNT: 13.1 % (ref 11.5–14.5)
HCT VFR BLD AUTO: 41.1 % (ref 37–47)
HGB BLD-MCNC: 13.6 G/DL (ref 12–16)
LYMPHOCYTES # BLD: 1.9 K/UL (ref 1–4.8)
LYMPHOCYTES NFR BLD: 27.4 %
MCH RBC QN AUTO: 30.4 PG (ref 27–31.3)
MCHC RBC AUTO-ENTMCNC: 33.1 % (ref 33–37)
MCV RBC AUTO: 91.9 FL (ref 79.4–94.8)
MONOCYTES # BLD: 0.4 K/UL (ref 0.2–0.8)
MONOCYTES NFR BLD: 5.2 %
NEUTROPHILS # BLD: 4.5 K/UL (ref 1.4–6.5)
NEUTS SEG NFR BLD: 65.1 %
PLATELET # BLD AUTO: 242 K/UL (ref 130–400)
RBC # BLD AUTO: 4.48 M/UL (ref 4.2–5.4)
WBC # BLD AUTO: 6.9 K/UL (ref 4.8–10.8)

## 2023-08-24 PROCEDURE — 99213 OFFICE O/P EST LOW 20 MIN: CPT | Performed by: PHYSICIAN ASSISTANT

## 2023-08-24 PROCEDURE — 3078F DIAST BP <80 MM HG: CPT | Performed by: PHYSICIAN ASSISTANT

## 2023-08-24 PROCEDURE — 3074F SYST BP LT 130 MM HG: CPT | Performed by: PHYSICIAN ASSISTANT

## 2023-08-24 RX ORDER — DICYCLOMINE HCL 20 MG
20 TABLET ORAL 2 TIMES DAILY
Qty: 60 TABLET | Refills: 0 | Status: SHIPPED | OUTPATIENT
Start: 2023-08-24 | End: 2023-09-23

## 2023-08-25 LAB — VITAMIN D 25-HYDROXY: 44.6 NG/ML

## 2023-08-30 ENCOUNTER — TELEPHONE (OUTPATIENT)
Dept: INTERNAL MEDICINE | Age: 48
End: 2023-08-30

## 2023-08-30 LAB — ECHO BSA: 1.81 M2

## 2023-08-31 ASSESSMENT — ENCOUNTER SYMPTOMS
CONSTIPATION: 0
BLOOD IN STOOL: 0
VOMITING: 0
ABDOMINAL DISTENTION: 0
RECTAL PAIN: 0
NAUSEA: 0
ANAL BLEEDING: 0
ABDOMINAL PAIN: 0
DIARRHEA: 1

## 2023-09-19 ENCOUNTER — OFFICE VISIT (OUTPATIENT)
Dept: INTERNAL MEDICINE | Age: 48
End: 2023-09-19
Payer: COMMERCIAL

## 2023-09-19 VITALS
RESPIRATION RATE: 16 BRPM | HEART RATE: 74 BPM | HEIGHT: 66 IN | WEIGHT: 159 LBS | OXYGEN SATURATION: 98 % | SYSTOLIC BLOOD PRESSURE: 122 MMHG | DIASTOLIC BLOOD PRESSURE: 74 MMHG | TEMPERATURE: 97.5 F | BODY MASS INDEX: 25.55 KG/M2

## 2023-09-19 DIAGNOSIS — J06.9 URI, ACUTE: Primary | ICD-10-CM

## 2023-09-19 PROCEDURE — 99213 OFFICE O/P EST LOW 20 MIN: CPT | Performed by: PHYSICIAN ASSISTANT

## 2023-09-19 PROCEDURE — 3074F SYST BP LT 130 MM HG: CPT | Performed by: PHYSICIAN ASSISTANT

## 2023-09-19 PROCEDURE — 3078F DIAST BP <80 MM HG: CPT | Performed by: PHYSICIAN ASSISTANT

## 2023-09-19 RX ORDER — AZITHROMYCIN 250 MG/1
TABLET, FILM COATED ORAL
Qty: 6 TABLET | Refills: 0 | Status: SHIPPED | OUTPATIENT
Start: 2023-09-19 | End: 2023-09-20 | Stop reason: ALTCHOICE

## 2023-09-19 RX ORDER — VIT C/VIT D3/E/ZINC/ELDERBERRY 65 MG-3.15
TABLET,CHEWABLE ORAL
COMMUNITY

## 2023-09-19 NOTE — PROGRESS NOTES
Anai Garcia (: 1975) is a 52 y.o. female, Established patient, here for evaluation of the following chief complaint(s):  Chest Congestion (Started at the end of Aug, Will feel like it will go away and then when she wakes up it is back. Her ears, throat will hurt, gets bad headaches and a cough.  )        ASSESSMENT/PLAN:  1. URI, acute  - RC for Zpak   - supportive treatment for symptoms   - hydrate   - f/u if needed         No follow-ups on file. SUBJECTIVE/OBJECTIVE:  URI   This is a new problem. The current episode started 1 to 4 weeks ago. The problem has been waxing and waning. Associated symptoms include congestion, ear pain, headaches, rhinorrhea, sinus pain and a sore throat. Pertinent negatives include no abdominal pain, chest pain, dysuria, plugged ear sensation, swollen glands or wheezing. She has tried nothing for the symptoms. Review of Systems   HENT:  Positive for congestion, ear pain, rhinorrhea, sinus pain and sore throat. Respiratory:  Negative for wheezing. Cardiovascular:  Negative for chest pain. Gastrointestinal:  Negative for abdominal pain. Genitourinary:  Negative for dysuria. Neurological:  Positive for headaches. Physical Exam  Vitals reviewed. Constitutional:       Appearance: Normal appearance. HENT:      Head: Normocephalic. Right Ear: Tympanic membrane normal.      Left Ear: Tympanic membrane normal.      Nose: Nose normal.   Eyes:      Extraocular Movements: Extraocular movements intact. Conjunctiva/sclera: Conjunctivae normal.      Pupils: Pupils are equal, round, and reactive to light. Cardiovascular:      Rate and Rhythm: Normal rate and regular rhythm. Heart sounds: Normal heart sounds. Pulmonary:      Effort: Pulmonary effort is normal.      Breath sounds: Normal breath sounds. Neurological:      Mental Status: She is alert.          Vitals:    23 1452   BP: 122/74   Site: Left Upper Arm   Position: Sitting

## 2023-09-20 ENCOUNTER — OFFICE VISIT (OUTPATIENT)
Dept: CARDIOLOGY CLINIC | Age: 48
End: 2023-09-20
Payer: COMMERCIAL

## 2023-09-20 VITALS
WEIGHT: 161 LBS | HEART RATE: 67 BPM | SYSTOLIC BLOOD PRESSURE: 120 MMHG | BODY MASS INDEX: 25.99 KG/M2 | DIASTOLIC BLOOD PRESSURE: 70 MMHG

## 2023-09-20 DIAGNOSIS — I47.1 SVT (SUPRAVENTRICULAR TACHYCARDIA) (HCC): Primary | ICD-10-CM

## 2023-09-20 PROCEDURE — 3074F SYST BP LT 130 MM HG: CPT | Performed by: INTERNAL MEDICINE

## 2023-09-20 PROCEDURE — 93000 ELECTROCARDIOGRAM COMPLETE: CPT | Performed by: INTERNAL MEDICINE

## 2023-09-20 PROCEDURE — 3078F DIAST BP <80 MM HG: CPT | Performed by: INTERNAL MEDICINE

## 2023-09-20 PROCEDURE — 99214 OFFICE O/P EST MOD 30 MIN: CPT | Performed by: INTERNAL MEDICINE

## 2023-09-20 ASSESSMENT — ENCOUNTER SYMPTOMS
ABDOMINAL PAIN: 0
COLOR CHANGE: 0
CONSTIPATION: 0
VOMITING: 0
CHEST TIGHTNESS: 0
RHINORRHEA: 0
DIARRHEA: 0
NAUSEA: 0
APNEA: 0
EYE REDNESS: 0
COUGH: 0
SHORTNESS OF BREATH: 0
WHEEZING: 0

## 2023-09-20 NOTE — PROGRESS NOTES
Chief Complaint   Patient presents with    Hypertension    Results     HOLTER MONITOR       Patient presents for initial medical evaluation. Patient is followed on a regular basis by ARIEL Rai. Hypertension  PVC burden 29.5% by Holter 8/2022  48-hour Holter monitor 8/10/2022 29.5% PVCs, no atrial fibrillation, no V. tach no major arrhythmias  Treadmill stress test 8/19/2022.   Patient exercised for 7 minutes no ischemia but frequent PVCs before during and after the test  TTE 1/5/2023 EF 60%  Arterial ultrasound 12/13/2022 no stenosis bilaterally  Holter monitor 7/24/2000 2348 hrs., no A-fib, no V. tach, PVC burden 0.6%, 2 episodes of SVT 4 beats longest  =======  9/20/2023  Follow-up on palpitations  Holter monitor 7/24/2023 PVC burden of 0.6%  Patient reports feeling better, but she still feels tired all the time  Denies chest pain or shortness of breath  Patient brought in a blood pressure log where blood pressures are ranging between 120s to 130s    3/8/2023  Follow-up on palpitations  Patient reports that she saw Dr. Ashley Burciaga who prescribed atenolol for her PVC management  States that she still trying to get used to this medication since when she takes it she feels a little bit woozy  Palpitations are still the same as before starting beta-blockers  Patient endorsing episodes of chest tightness which are associated with the palpitations  Denies shortness of breath  TTE 1/5/2023 EF 60%    12/7/2022  Follow-up on hypertension and palpitations  Patient underwent Holter monitor 8/2022 where patient was found with 29% PVC burden  Treadmill stress test showed frequent PVCs before during and after the test with no ischemia or major arrhythmias  Patient brought in a blood pressure log where blood pressures for the most part are running between 110s to 130s  Heart rates fluctuate between 50s to 100s (likely related to frequent ectopy)  Patient feels palpitations at least once a day along with these

## 2023-09-25 ASSESSMENT — ENCOUNTER SYMPTOMS
SORE THROAT: 1
ABDOMINAL PAIN: 0
SWOLLEN GLANDS: 0
WHEEZING: 0
RHINORRHEA: 1
SINUS PAIN: 1

## 2023-10-26 RX ORDER — LEVOTHYROXINE SODIUM 0.05 MG/1
50 TABLET ORAL DAILY
Qty: 90 TABLET | Refills: 3 | Status: SHIPPED | OUTPATIENT
Start: 2023-10-26

## 2023-10-30 LAB
CHOLESTEROL, TOTAL: 116 MG/DL
CHOLESTEROL/HDL RATIO: 2.1
ESTIMATED AVERAGE GLUCOSE: 106
HBA1C MFR BLD: 5.4 %
HDLC SERPL-MCNC: 54 MG/DL (ref 35–70)
LDL CHOLESTEROL CALCULATED: 43 MG/DL (ref 0–160)
NONHDLC SERPL-MCNC: 62 MG/DL
TRIGL SERPL-MCNC: 104 MG/DL
VLDLC SERPL CALC-MCNC: NORMAL MG/DL

## 2023-11-06 DIAGNOSIS — E03.8 OTHER SPECIFIED HYPOTHYROIDISM: ICD-10-CM

## 2023-11-06 LAB
T4 FREE SERPL-MCNC: 1.09 NG/DL (ref 0.84–1.68)
TSH SERPL-MCNC: 0.98 UIU/ML (ref 0.44–3.86)

## 2023-11-17 ENCOUNTER — OFFICE VISIT (OUTPATIENT)
Dept: ENDOCRINOLOGY | Age: 48
End: 2023-11-17
Payer: COMMERCIAL

## 2023-11-17 VITALS
OXYGEN SATURATION: 96 % | BODY MASS INDEX: 26.52 KG/M2 | HEART RATE: 70 BPM | HEIGHT: 66 IN | SYSTOLIC BLOOD PRESSURE: 102 MMHG | WEIGHT: 165 LBS | DIASTOLIC BLOOD PRESSURE: 78 MMHG

## 2023-11-17 DIAGNOSIS — E07.9 THYROID DYSFUNCTION: ICD-10-CM

## 2023-11-17 DIAGNOSIS — E03.8 OTHER SPECIFIED HYPOTHYROIDISM: Primary | ICD-10-CM

## 2023-11-17 PROCEDURE — 3078F DIAST BP <80 MM HG: CPT | Performed by: PHYSICIAN ASSISTANT

## 2023-11-17 PROCEDURE — 3074F SYST BP LT 130 MM HG: CPT | Performed by: PHYSICIAN ASSISTANT

## 2023-11-17 PROCEDURE — 99214 OFFICE O/P EST MOD 30 MIN: CPT | Performed by: PHYSICIAN ASSISTANT

## 2023-11-17 ASSESSMENT — ENCOUNTER SYMPTOMS
DIARRHEA: 0
VOMITING: 0
SORE THROAT: 0
SINUS PRESSURE: 0
RHINORRHEA: 0
SHORTNESS OF BREATH: 0
WHEEZING: 0
ABDOMINAL PAIN: 0
COUGH: 0
NAUSEA: 0

## 2023-11-17 NOTE — PROGRESS NOTES
nodules  Cardiovascular:      Rate and Rhythm: Normal rate and regular rhythm. Heart sounds: Normal heart sounds. Pulmonary:      Effort: Pulmonary effort is normal.      Breath sounds: Normal breath sounds. Abdominal:      General: Bowel sounds are normal.      Palpations: Abdomen is soft. Musculoskeletal:         General: Normal range of motion. Cervical back: Normal range of motion and neck supple. Skin:     General: Skin is warm and dry. Neurological:      General: No focal deficit present. Mental Status: She is alert and oriented to person, place, and time.    Psychiatric:         Mood and Affect: Mood normal.

## 2023-11-23 DIAGNOSIS — E78.5 DYSLIPIDEMIA (HIGH LDL; LOW HDL): ICD-10-CM

## 2023-11-24 NOTE — TELEPHONE ENCOUNTER
Comments:     Last Office Visit (last PCP visit):   9/19/2023    Next Visit Date:  Future Appointments   Date Time Provider [address]  [address]   3/20/2024 10:00 AM Chantal Silver MD [address] [facility]   5/17/2024  9:30 AM ARIEL Vargas       **If hasn't been seen in over a year OR hasn't followed up according to last diabetes/ADHD visit, make appointment for patient before sending refill to provider.     Rx requested:  Requested Prescriptions     Pending Prescriptions Disp Refills    atorvastatin (LIPITOR) 40 MG tablet [Pharmacy Med Name: ATORVASTATIN 40 MG TABLET] 90 tablet 3     Sig: TAKE 1 TABLET BY MOUTH EVERY DAY

## 2023-11-27 RX ORDER — ATORVASTATIN CALCIUM 40 MG/1
40 TABLET, FILM COATED ORAL DAILY
Qty: 90 TABLET | Refills: 3 | Status: SHIPPED | OUTPATIENT
Start: 2023-11-27

## 2024-01-04 ENCOUNTER — OFFICE VISIT (OUTPATIENT)
Dept: INTERNAL MEDICINE | Age: 49
End: 2024-01-04
Payer: COMMERCIAL

## 2024-01-04 VITALS
OXYGEN SATURATION: 97 % | TEMPERATURE: 97.2 F | SYSTOLIC BLOOD PRESSURE: 134 MMHG | DIASTOLIC BLOOD PRESSURE: 86 MMHG | BODY MASS INDEX: 27.64 KG/M2 | HEIGHT: 66 IN | HEART RATE: 64 BPM | WEIGHT: 172 LBS

## 2024-01-04 DIAGNOSIS — Z23 NEED FOR INFLUENZA VACCINATION: ICD-10-CM

## 2024-01-04 DIAGNOSIS — R82.90 BAD ODOR OF URINE: ICD-10-CM

## 2024-01-04 DIAGNOSIS — R82.90 BAD ODOR OF URINE: Primary | ICD-10-CM

## 2024-01-04 LAB
BILIRUBIN, POC: ABNORMAL
BLOOD URINE, POC: 80
CLARITY, POC: CLEAR
COLOR, POC: YELLOW
GLUCOSE URINE, POC: ABNORMAL
KETONES, POC: ABNORMAL
LEUKOCYTE EST, POC: ABNORMAL
NITRITE, POC: ABNORMAL
PH, POC: 6
PROTEIN, POC: ABNORMAL
SPECIFIC GRAVITY, POC: 1.03
UROBILINOGEN, POC: 0.2

## 2024-01-04 PROCEDURE — 81003 URINALYSIS AUTO W/O SCOPE: CPT | Performed by: PHYSICIAN ASSISTANT

## 2024-01-04 ASSESSMENT — PATIENT HEALTH QUESTIONNAIRE - PHQ9
SUM OF ALL RESPONSES TO PHQ QUESTIONS 1-9: 2
SUM OF ALL RESPONSES TO PHQ9 QUESTIONS 1 & 2: 2
2. FEELING DOWN, DEPRESSED OR HOPELESS: 1
SUM OF ALL RESPONSES TO PHQ QUESTIONS 1-9: 2
SUM OF ALL RESPONSES TO PHQ9 QUESTIONS 1 & 2: 2
1. LITTLE INTEREST OR PLEASURE IN DOING THINGS: SEVERAL DAYS
2. FEELING DOWN, DEPRESSED OR HOPELESS: SEVERAL DAYS
1. LITTLE INTEREST OR PLEASURE IN DOING THINGS: 1

## 2024-01-04 NOTE — PROGRESS NOTES
Margarita Wahl (: 1975) is a 48 y.o. female, Established patient, here for evaluation of the following chief complaint(s):  Back Pain (Lower back, x3-4 months) and Discuss Labs (Done 10/30/23)        ASSESSMENT/PLAN:  1. Bad odor of urine  - POCT Urinalysis No Micro (Auto)- neg   - Culture, Urine; Future  - increase fluids, will send out the culture      2. Need for influenza vaccination  - Influenza, FLUCELVAX, (age 6 mo+), IM, Preservative Free, 0.5 mL        No follow-ups on file.    SUBJECTIVE/OBJECTIVE:  HPI    F/u low back pain   Bad odor of the urine    Started 2 months ago   No dysuria or hematuria       Review of Systems   Genitourinary:  Negative for decreased urine volume, dyspareunia, dysuria, hematuria and urgency.   All other systems reviewed and are negative.      Physical Exam  Vitals reviewed.   Constitutional:       Appearance: Normal appearance.   HENT:      Head: Normocephalic.   Cardiovascular:      Rate and Rhythm: Normal rate.      Heart sounds: Normal heart sounds.   Pulmonary:      Effort: Pulmonary effort is normal.   Abdominal:      Tenderness: There is no right CVA tenderness or left CVA tenderness.   Neurological:      Mental Status: She is alert.         Vitals:    24 1130   BP: 134/86   Pulse: 64   Temp: 97.2 °F (36.2 °C)   TempSrc: Infrared   SpO2: 97%   Weight: 78 kg (172 lb)   Height: 1.676 m (5' 6\")                 An electronic signature was used to authenticate this note.    --ARIEL Baker

## 2024-01-04 NOTE — PROGRESS NOTES
Vaccine Information Sheet, \"Influenza - Inactivated\"  given to Margarita Wahl, or parent/legal guardian of  Margarita Wahl and verbalized understanding.    Patient responses:    Have you ever had a reaction to a flu vaccine? No  Are you able to eat eggs without adverse effects?  No  Do you have any current illness?  No  Have you ever had Guillian Applegate Syndrome?  No    Flu vaccine given per order. Please see immunization tab.

## 2024-01-06 LAB — BACTERIA UR CULT: NORMAL

## 2024-02-22 RX ORDER — LOSARTAN POTASSIUM 25 MG/1
25 TABLET ORAL DAILY
Qty: 90 TABLET | Refills: 3 | Status: SHIPPED | OUTPATIENT
Start: 2024-02-22

## 2024-02-22 NOTE — TELEPHONE ENCOUNTER
Requesting medication refill. Please approve or deny this request.    Rx requested:  Requested Prescriptions     Pending Prescriptions Disp Refills    losartan (COZAAR) 25 MG tablet [Pharmacy Med Name: LOSARTAN POTASSIUM 25 MG TAB] 90 tablet 11     Sig: TAKE 1 TABLET BY MOUTH EVERY DAY         Last Office Visit:   9/20/2023      Next Visit Date:  Future Appointments   Date Time Provider Department Center   3/20/2024 10:00 AM Goldstein, Tam, MD [place] [facility]   5/17/2024  9:30 AM Mercado, Lenard S, PA [facility]               Last refill 12/07/2022. Please approve or deny.

## 2024-03-05 ENCOUNTER — OFFICE VISIT (OUTPATIENT)
Dept: INTERNAL MEDICINE | Age: 49
End: 2024-03-05
Payer: COMMERCIAL

## 2024-03-05 VITALS
SYSTOLIC BLOOD PRESSURE: 122 MMHG | TEMPERATURE: 97.7 F | RESPIRATION RATE: 16 BRPM | WEIGHT: 176 LBS | DIASTOLIC BLOOD PRESSURE: 72 MMHG | BODY MASS INDEX: 28.28 KG/M2 | OXYGEN SATURATION: 98 % | HEIGHT: 66 IN | HEART RATE: 70 BPM

## 2024-03-05 DIAGNOSIS — R51.9 HEADACHE BEHIND THE EYES: ICD-10-CM

## 2024-03-05 PROCEDURE — 3078F DIAST BP <80 MM HG: CPT | Performed by: PHYSICIAN ASSISTANT

## 2024-03-05 PROCEDURE — 3074F SYST BP LT 130 MM HG: CPT | Performed by: PHYSICIAN ASSISTANT

## 2024-03-05 PROCEDURE — 99214 OFFICE O/P EST MOD 30 MIN: CPT | Performed by: PHYSICIAN ASSISTANT

## 2024-03-05 RX ORDER — TOPIRAMATE 50 MG/1
50 TABLET, FILM COATED ORAL NIGHTLY
Qty: 90 TABLET | Refills: 0 | Status: SHIPPED | OUTPATIENT
Start: 2024-03-05

## 2024-03-05 RX ORDER — SUMATRIPTAN 50 MG/1
TABLET, FILM COATED ORAL
Qty: 9 TABLET | Refills: 3 | Status: SHIPPED | OUTPATIENT
Start: 2024-03-05

## 2024-03-05 ASSESSMENT — VISUAL ACUITY: OU: 1

## 2024-03-05 NOTE — PROGRESS NOTES
Margarita Wahl (: 1975) is a 48 y.o. female, Established patient, here for evaluation of the following chief complaint(s):  Headache (Headaches started on Friday and lasted all the way thru Monday night.  It felt like she was it in the head, and her head was in a vice/Sumatriptin doesn't help)        ASSESSMENT/PLAN:  1. Headache behind the eyes  - will add Topamax for preventative   - look for the triggers   - topiramate (TOPAMAX) 50 MG tablet; Take 1 tablet by mouth nightly  Dispense: 90 tablet; Refill: 0  - SUMAtriptan (IMITREX) 50 MG tablet; Take one tablet at onset of headache,  then 2nd dose in 2 hrs if needed  Dispense: 9 tablet; Refill: 3        No follow-ups on file.    SUBJECTIVE/OBJECTIVE:  HPI      Headaches are getting worse and more often   She has been on Imitrex only, and thi is not longer helping   Headaches are behind the eyes   They are lasting 5 or more days        Review of Systems   All other systems reviewed and are negative.      Physical Exam  Vitals reviewed.   Constitutional:       Appearance: Normal appearance.   Eyes:      General: Vision grossly intact. Gaze aligned appropriately.      Extraocular Movements: Extraocular movements intact.   Cardiovascular:      Rate and Rhythm: Normal rate.      Heart sounds: Normal heart sounds.   Pulmonary:      Effort: Pulmonary effort is normal.   Skin:     General: Skin is warm.   Neurological:      General: No focal deficit present.      Mental Status: She is alert and oriented to person, place, and time.         Vitals:    24 0809   BP: 122/72   Site: Right Upper Arm   Position: Sitting   Cuff Size: Medium Adult   Pulse: 70   Resp: 16   Temp: 97.7 °F (36.5 °C)   SpO2: 98%   Weight: 79.8 kg (176 lb)   Height: 1.676 m (5' 6\")                 An electronic signature was used to authenticate this note.    --ARIEL Baker

## 2024-03-20 ENCOUNTER — OFFICE VISIT (OUTPATIENT)
Dept: CARDIOLOGY CLINIC | Age: 49
End: 2024-03-20
Payer: COMMERCIAL

## 2024-03-20 VITALS
RESPIRATION RATE: 15 BRPM | OXYGEN SATURATION: 99 % | BODY MASS INDEX: 28.12 KG/M2 | WEIGHT: 175 LBS | HEIGHT: 66 IN | HEART RATE: 78 BPM | DIASTOLIC BLOOD PRESSURE: 80 MMHG | SYSTOLIC BLOOD PRESSURE: 122 MMHG

## 2024-03-20 DIAGNOSIS — I10 HTN (HYPERTENSION) WITH GOAL TO BE DETERMINED: Primary | ICD-10-CM

## 2024-03-20 PROCEDURE — 3074F SYST BP LT 130 MM HG: CPT | Performed by: INTERNAL MEDICINE

## 2024-03-20 PROCEDURE — 3079F DIAST BP 80-89 MM HG: CPT | Performed by: INTERNAL MEDICINE

## 2024-03-20 PROCEDURE — 99214 OFFICE O/P EST MOD 30 MIN: CPT | Performed by: INTERNAL MEDICINE

## 2024-03-20 ASSESSMENT — ENCOUNTER SYMPTOMS
ABDOMINAL PAIN: 0
VOMITING: 0
EYE REDNESS: 0
CONSTIPATION: 0
RHINORRHEA: 0
DIARRHEA: 0
COLOR CHANGE: 0
COUGH: 0
SHORTNESS OF BREATH: 0
NAUSEA: 0
CHEST TIGHTNESS: 0

## 2024-03-20 NOTE — PROGRESS NOTES
baseline.   Psychiatric:         Mood and Affect: Mood normal.        EKG: normal EKG, normal sinus rhythm    No orders of the defined types were placed in this encounter.        The ASCVD Risk score (Sujey POTTS, et al., 2019) failed to calculate for the following reasons:    The valid total cholesterol range is 130 to 320 mg/dL     ASSESSMENT:     Diagnosis Orders   1. HTN (hypertension) with goal to be determined          PLAN:   PVC burden 29%  Holter monitor 7/24/2023 PVC burden of 0.6%  Palpitations have decreased  Patient did not tolerate atenolol  Currently tolerating metoprolol well 50 mg p.o. twice daily  TSH elevated September 2022    Hypertension  Chlorthalidone.  This medication was stopped secondary to leg cramping  Continue losartan 25 mg daily  Patient was advised and encouraged to check blood pressures at home or at pharmacy daily, maintain a logbook and also call us back if blood pressures are above or below the target ranges.  Patient clearly understands and agrees to the instructions.    Claudication/muscle cramp  Arterial ultrasound 12/13/2022 no stenosis bilaterally    Return to clinic in 6 months    Recommended patient to eat diets that emphasize high intakes of vegetables, fruits, nuts, whole grains, lean vegetable or animal protein, and fish. As per 2019 ACC/AHA guideline on primary prevention of CVD     Recommended patient to engage in at least 150 minutes per week of accumulated moderate-intensity physical activity or 75 minutes per week of vigorous-intensity physical activity as per 2019 ACC/AHA guideline on primary prevention of CVD     This note was transcribed using voice recognition software.  Every effort was made to ensure accuracy; however, inadvertent computerized transcription errors may be present.

## 2024-05-09 DIAGNOSIS — E03.8 OTHER SPECIFIED HYPOTHYROIDISM: ICD-10-CM

## 2024-05-09 LAB
T4 FREE SERPL-MCNC: 1.15 NG/DL (ref 0.84–1.68)
TSH SERPL-MCNC: 1.79 UIU/ML (ref 0.44–3.86)

## 2024-05-14 DIAGNOSIS — R51.9 HEADACHE BEHIND THE EYES: ICD-10-CM

## 2024-05-15 NOTE — TELEPHONE ENCOUNTER
Comments:     Last Office Visit (last PCP visit):   3/5/2024    Next Visit Date:  Future Appointments   Date Time Provider Department Center   5/17/2024  9:30 AM Mercado, Lenard S, PA [facility]   9/18/2024 10:00 AM Goldstein, Tam, MD [place] [facility]       **If hasn't been seen in over a year OR hasn't followed up according to last diabetes/ADHD visit, make appointment for patient before sending refill to provider.    Rx requested:  Requested Prescriptions     Pending Prescriptions Disp Refills    topiramate (TOPAMAX) 50 MG tablet 90 tablet 0     Sig: Take 1 tablet by mouth nightly

## 2024-05-16 RX ORDER — TOPIRAMATE 50 MG/1
50 TABLET, FILM COATED ORAL NIGHTLY
Qty: 90 TABLET | Refills: 2 | Status: SHIPPED | OUTPATIENT
Start: 2024-05-16

## 2024-05-17 ENCOUNTER — OFFICE VISIT (OUTPATIENT)
Dept: ENDOCRINOLOGY | Age: 49
End: 2024-05-17
Payer: COMMERCIAL

## 2024-05-17 VITALS
SYSTOLIC BLOOD PRESSURE: 124 MMHG | HEART RATE: 86 BPM | HEIGHT: 66 IN | OXYGEN SATURATION: 98 % | WEIGHT: 179 LBS | DIASTOLIC BLOOD PRESSURE: 82 MMHG | BODY MASS INDEX: 28.77 KG/M2

## 2024-05-17 DIAGNOSIS — E03.8 OTHER SPECIFIED HYPOTHYROIDISM: Primary | ICD-10-CM

## 2024-05-17 PROCEDURE — 3074F SYST BP LT 130 MM HG: CPT | Performed by: PHYSICIAN ASSISTANT

## 2024-05-17 PROCEDURE — 99214 OFFICE O/P EST MOD 30 MIN: CPT | Performed by: PHYSICIAN ASSISTANT

## 2024-05-17 PROCEDURE — 3079F DIAST BP 80-89 MM HG: CPT | Performed by: PHYSICIAN ASSISTANT

## 2024-05-17 RX ORDER — LEVOTHYROXINE SODIUM 0.05 MG/1
50 TABLET ORAL DAILY
Qty: 90 TABLET | Refills: 3 | Status: SHIPPED | OUTPATIENT
Start: 2024-05-17

## 2024-05-17 ASSESSMENT — ENCOUNTER SYMPTOMS
SORE THROAT: 0
NAUSEA: 0
SHORTNESS OF BREATH: 0
COUGH: 0
RHINORRHEA: 0
WHEEZING: 0
VOMITING: 0
DIARRHEA: 0
ABDOMINAL PAIN: 0

## 2024-05-17 NOTE — PROGRESS NOTES
03/21/2023    BUN 14 03/21/2023    CREATININE 0.85 03/21/2023    GLUCOSE 76 03/21/2023    CALCIUM 9.0 03/21/2023    BILITOT 1.1 (H) 03/21/2023    ALKPHOS 67 03/21/2023    AST 16 03/21/2023    ALT 14 03/21/2023    LABGLOM >60.0 03/21/2023    GFRAA >60.0 09/19/2022    GLOB 2.8 03/21/2023     Lab Results   Component Value Date    WBC 6.9 08/24/2023    HGB 13.6 08/24/2023    HCT 41.1 08/24/2023    MCV 91.9 08/24/2023     08/24/2023     Lab Results   Component Value Date    LABA1C 5.4 10/30/2023    LABA1C 5.3 01/08/2020     Lab Results   Component Value Date    HDL 54 10/30/2023    HDL 44 03/21/2023    HDL 49 07/12/2022    CHOL 116 10/30/2023    CHOL 116 03/21/2023    CHOL 236 (H) 07/12/2022    TRIG 104 10/30/2023    TRIG 115 03/21/2023    TRIG 127 07/12/2022     No results found for: \"TESTOSTERONE\", \"SHBG\", \"TESTFREENM\"  Lab Results   Component Value Date    TSH 1.790 05/09/2024    TSH 0.976 11/06/2023    TSH 1.550 03/21/2023    FT3 2.76 03/21/2023    T4FREE 1.15 05/09/2024    T4FREE 1.09 11/06/2023    T4FREE 1.12 03/21/2023     Lab Results   Component Value Date    TPOABS <4.0 03/21/2023       Review of Systems   Constitutional:  Positive for fatigue. Negative for chills, fever, weight gain and weight loss.   HENT:  Negative for congestion, ear pain, postnasal drip, rhinorrhea, sinus pressure and sore throat.    Eyes:  Negative for visual disturbance.   Respiratory:  Negative for cough, shortness of breath and wheezing.    Cardiovascular:  Negative for chest pain, palpitations and leg swelling.   Gastrointestinal:  Negative for abdominal pain, diarrhea, nausea and vomiting.   Endocrine: Negative for cold intolerance, heat intolerance, polydipsia and polyuria.   Genitourinary:  Negative for difficulty urinating.   Musculoskeletal:  Negative for arthralgias.   Skin:  Negative for rash.   Allergic/Immunologic: Negative for environmental allergies.   Neurological:  Negative for dizziness and headaches.

## 2024-05-24 DIAGNOSIS — F43.23 ADJUSTMENT DISORDER WITH MIXED ANXIETY AND DEPRESSED MOOD: ICD-10-CM

## 2024-05-24 RX ORDER — FLUOXETINE HYDROCHLORIDE 40 MG/1
CAPSULE ORAL DAILY
Qty: 90 CAPSULE | Refills: 3 | Status: SHIPPED | OUTPATIENT
Start: 2024-05-24

## 2024-08-15 RX ORDER — METOPROLOL TARTRATE 50 MG/1
TABLET, FILM COATED ORAL
Qty: 180 TABLET | Refills: 3 | Status: SHIPPED | OUTPATIENT
Start: 2024-08-15

## 2024-08-15 NOTE — TELEPHONE ENCOUNTER
Requesting medication refill. Please approve or deny this request.    Rx requested:  Requested Prescriptions     Pending Prescriptions Disp Refills    metoprolol tartrate (LOPRESSOR) 50 MG tablet [Pharmacy Med Name: METOPROLOL TARTRATE 50 MG TAB] 180 tablet 3     Sig: TAKE 1 TABLET BY MOUTH TWICE A DAY         Last Office Visit:   3/20/2024      Next Visit Date:  Future Appointments   Date Time Provider Department Center   9/18/2024 10:00 AM Goldstein, Tam, MD [facility]   11/22/2024  8:45 AM Mercado, Lenard S, PA [facility]               Last refill 08/06/2023. Please approve or deny.

## 2024-09-18 ENCOUNTER — OFFICE VISIT (OUTPATIENT)
Dept: CARDIOLOGY CLINIC | Age: 49
End: 2024-09-18
Payer: COMMERCIAL

## 2024-09-18 VITALS
RESPIRATION RATE: 16 BRPM | HEART RATE: 87 BPM | WEIGHT: 177 LBS | SYSTOLIC BLOOD PRESSURE: 118 MMHG | BODY MASS INDEX: 28.57 KG/M2 | OXYGEN SATURATION: 98 % | DIASTOLIC BLOOD PRESSURE: 80 MMHG

## 2024-09-18 DIAGNOSIS — I10 HTN (HYPERTENSION) WITH GOAL TO BE DETERMINED: Primary | ICD-10-CM

## 2024-09-18 DIAGNOSIS — I47.10 SVT (SUPRAVENTRICULAR TACHYCARDIA) (HCC): ICD-10-CM

## 2024-09-18 PROCEDURE — 99214 OFFICE O/P EST MOD 30 MIN: CPT | Performed by: INTERNAL MEDICINE

## 2024-09-18 PROCEDURE — 3074F SYST BP LT 130 MM HG: CPT | Performed by: INTERNAL MEDICINE

## 2024-09-18 PROCEDURE — 3079F DIAST BP 80-89 MM HG: CPT | Performed by: INTERNAL MEDICINE

## 2024-09-18 PROCEDURE — 93000 ELECTROCARDIOGRAM COMPLETE: CPT | Performed by: INTERNAL MEDICINE

## 2024-09-18 ASSESSMENT — ENCOUNTER SYMPTOMS
DIARRHEA: 0
CONSTIPATION: 0
ABDOMINAL PAIN: 0
APNEA: 0
NAUSEA: 0
CHEST TIGHTNESS: 0
WHEEZING: 0
VOMITING: 0
EYE REDNESS: 0
SHORTNESS OF BREATH: 0
COUGH: 0
RHINORRHEA: 0
COLOR CHANGE: 0

## 2024-11-12 DIAGNOSIS — E03.8 OTHER SPECIFIED HYPOTHYROIDISM: ICD-10-CM

## 2024-11-12 LAB
ALBUMIN SERPL-MCNC: 3.9 G/DL (ref 3.5–4.6)
ALP SERPL-CCNC: 80 U/L (ref 40–130)
ALT SERPL-CCNC: 17 U/L (ref 0–33)
ANION GAP SERPL CALCULATED.3IONS-SCNC: 11 MEQ/L (ref 9–15)
AST SERPL-CCNC: 22 U/L (ref 0–35)
BILIRUB SERPL-MCNC: 0.8 MG/DL (ref 0.2–0.7)
BUN SERPL-MCNC: 10 MG/DL (ref 6–20)
CALCIUM SERPL-MCNC: 8.8 MG/DL (ref 8.5–9.9)
CHLORIDE SERPL-SCNC: 105 MEQ/L (ref 95–107)
CO2 SERPL-SCNC: 22 MEQ/L (ref 20–31)
CREAT SERPL-MCNC: 0.88 MG/DL (ref 0.5–0.9)
GLOBULIN SER CALC-MCNC: 2.7 G/DL (ref 2.3–3.5)
GLUCOSE SERPL-MCNC: 92 MG/DL (ref 70–99)
POTASSIUM SERPL-SCNC: 4.2 MEQ/L (ref 3.4–4.9)
PROT SERPL-MCNC: 6.6 G/DL (ref 6.3–8)
SODIUM SERPL-SCNC: 138 MEQ/L (ref 135–144)
T4 FREE SERPL-MCNC: 1.16 NG/DL (ref 0.84–1.68)
TSH REFLEX: 1.17 UIU/ML (ref 0.44–3.86)

## 2024-11-22 ENCOUNTER — OFFICE VISIT (OUTPATIENT)
Dept: ENDOCRINOLOGY | Age: 49
End: 2024-11-22
Payer: COMMERCIAL

## 2024-11-22 VITALS
BODY MASS INDEX: 27.48 KG/M2 | WEIGHT: 171 LBS | SYSTOLIC BLOOD PRESSURE: 102 MMHG | HEIGHT: 66 IN | DIASTOLIC BLOOD PRESSURE: 74 MMHG | HEART RATE: 70 BPM

## 2024-11-22 DIAGNOSIS — E03.8 OTHER SPECIFIED HYPOTHYROIDISM: Primary | ICD-10-CM

## 2024-11-22 PROCEDURE — 3078F DIAST BP <80 MM HG: CPT | Performed by: PHYSICIAN ASSISTANT

## 2024-11-22 PROCEDURE — 3074F SYST BP LT 130 MM HG: CPT | Performed by: PHYSICIAN ASSISTANT

## 2024-11-22 PROCEDURE — 99213 OFFICE O/P EST LOW 20 MIN: CPT | Performed by: PHYSICIAN ASSISTANT

## 2024-11-22 RX ORDER — LEVOTHYROXINE SODIUM 50 UG/1
50 TABLET ORAL DAILY
Qty: 90 TABLET | Refills: 3 | Status: SHIPPED | OUTPATIENT
Start: 2024-11-22

## 2024-11-22 ASSESSMENT — ENCOUNTER SYMPTOMS
SINUS PRESSURE: 0
SHORTNESS OF BREATH: 0
RHINORRHEA: 0
ABDOMINAL PAIN: 0
SORE THROAT: 0
WHEEZING: 0
NAUSEA: 0
VOMITING: 0
COUGH: 0
DIARRHEA: 0

## 2024-11-22 NOTE — PROGRESS NOTES
Not on file   Social Connections: Not on file   Intimate Partner Violence: Not on file   Housing Stability: Unknown (5/16/2023)    Housing Stability Vital Sign     Unable to Pay for Housing in the Last Year: Not on file     Number of Places Lived in the Last Year: Not on file     Unstable Housing in the Last Year: No     Family History   Problem Relation Age of Onset    High Cholesterol Mother     High Blood Pressure Mother     Arthritis Mother     Diabetes Maternal Grandmother     Heart Disease Maternal Grandmother     Heart Disease Maternal Grandfather      Allergies   Allergen Reactions    Sulfa Antibiotics Hives    Amoxicillin Headaches, Nausea And Vomiting and Swelling       Current Outpatient Medications:     levothyroxine (SYNTHROID) 50 MCG tablet, Take 1 tablet by mouth daily, Disp: 90 tablet, Rfl: 3    metoprolol tartrate (LOPRESSOR) 50 MG tablet, TAKE 1 TABLET BY MOUTH TWICE A DAY, Disp: 180 tablet, Rfl: 3    FLUoxetine (PROZAC) 40 MG capsule, TAKE 1 CAPSULE BY MOUTH EVERY DAY, Disp: 90 capsule, Rfl: 3    topiramate (TOPAMAX) 50 MG tablet, Take 1 tablet by mouth nightly, Disp: 90 tablet, Rfl: 2    SUMAtriptan (IMITREX) 50 MG tablet, Take one tablet at onset of headache,  then 2nd dose in 2 hrs if needed, Disp: 9 tablet, Rfl: 3    losartan (COZAAR) 25 MG tablet, TAKE 1 TABLET BY MOUTH EVERY DAY, Disp: 90 tablet, Rfl: 3    atorvastatin (LIPITOR) 40 MG tablet, TAKE 1 TABLET BY MOUTH EVERY DAY, Disp: 90 tablet, Rfl: 3    NORTREL 0.5/35, 28, 0.5-35 MG-MCG per tablet, TAKE 1 TABLET BY MOUTH ONCE DAILY. NEW PACK EVER 21 DAYS, Disp: , Rfl:     dicyclomine (BENTYL) 20 MG tablet, Take 1 tablet by mouth in the morning and at bedtime (Patient not taking: Reported on 3/5/2024), Disp: 60 tablet, Rfl: 0  Lab Results   Component Value Date     11/12/2024    K 4.2 11/12/2024     11/12/2024    CO2 22 11/12/2024    BUN 10 11/12/2024    CREATININE 0.88 11/12/2024    GLUCOSE 92 11/12/2024    CALCIUM 8.8 11/12/2024

## 2024-12-11 ENCOUNTER — OFFICE VISIT (OUTPATIENT)
Dept: CARDIOLOGY CLINIC | Age: 49
End: 2024-12-11
Payer: COMMERCIAL

## 2024-12-11 VITALS
WEIGHT: 171.8 LBS | DIASTOLIC BLOOD PRESSURE: 72 MMHG | OXYGEN SATURATION: 97 % | SYSTOLIC BLOOD PRESSURE: 104 MMHG | HEART RATE: 88 BPM | BODY MASS INDEX: 27.73 KG/M2

## 2024-12-11 DIAGNOSIS — I10 HTN (HYPERTENSION) WITH GOAL TO BE DETERMINED: Primary | ICD-10-CM

## 2024-12-11 DIAGNOSIS — I49.3 PVC (PREMATURE VENTRICULAR CONTRACTION): ICD-10-CM

## 2024-12-11 PROCEDURE — 99214 OFFICE O/P EST MOD 30 MIN: CPT | Performed by: INTERNAL MEDICINE

## 2024-12-11 PROCEDURE — 3078F DIAST BP <80 MM HG: CPT | Performed by: INTERNAL MEDICINE

## 2024-12-11 PROCEDURE — 3074F SYST BP LT 130 MM HG: CPT | Performed by: INTERNAL MEDICINE

## 2024-12-11 ASSESSMENT — ENCOUNTER SYMPTOMS
RHINORRHEA: 0
VOMITING: 0
CONSTIPATION: 0
ABDOMINAL PAIN: 0
SHORTNESS OF BREATH: 0
DIARRHEA: 0
APNEA: 0
WHEEZING: 0
COLOR CHANGE: 0
NAUSEA: 0
CHEST TIGHTNESS: 0
COUGH: 0
EYE REDNESS: 0

## 2024-12-11 NOTE — PROGRESS NOTES
Take one tablet at onset of headache,  then 2nd dose in 2 hrs if needed 9 tablet 3    losartan (COZAAR) 25 MG tablet TAKE 1 TABLET BY MOUTH EVERY DAY 90 tablet 3    atorvastatin (LIPITOR) 40 MG tablet TAKE 1 TABLET BY MOUTH EVERY DAY 90 tablet 3    NORTREL 0.5/35, 28, 0.5-35 MG-MCG per tablet TAKE 1 TABLET BY MOUTH ONCE DAILY. NEW PACK EVER 21 DAYS      dicyclomine (BENTYL) 20 MG tablet Take 1 tablet by mouth in the morning and at bedtime (Patient not taking: Reported on 3/5/2024) 60 tablet 0     No current facility-administered medications for this visit.       Sulfa antibiotics and Amoxicillin    Review of Systems:  Review of Systems   Constitutional:  Negative for activity change, chills, diaphoresis and fever.   HENT:  Negative for congestion, ear pain, nosebleeds and rhinorrhea.    Eyes:  Negative for redness and visual disturbance.   Respiratory:  Negative for apnea, cough, chest tightness, shortness of breath and wheezing.    Cardiovascular:  Positive for palpitations. Negative for chest pain and leg swelling.   Gastrointestinal:  Negative for abdominal pain, constipation, diarrhea, nausea and vomiting.   Genitourinary:  Negative for difficulty urinating and dysuria.   Musculoskeletal: Negative.  Negative for joint swelling.   Skin:  Negative for color change, rash and wound.   Neurological:  Negative for dizziness, syncope, weakness, numbness and headaches.   Psychiatric/Behavioral: Negative.          VITALS:  Blood pressure 104/72, pulse 88, weight 77.9 kg (171 lb 12.8 oz), SpO2 97%.  Body mass index is 27.73 kg/m².    Physical Examination:  Physical Exam  Vitals and nursing note reviewed.   Constitutional:       Appearance: Normal appearance.   HENT:      Head: Normocephalic and atraumatic.      Mouth/Throat:      Mouth: Mucous membranes are moist.      Pharynx: Oropharynx is clear.   Eyes:      Extraocular Movements: Extraocular movements intact.      Conjunctiva/sclera: Conjunctivae normal.      Pupils:

## 2024-12-18 DIAGNOSIS — I47.10 SVT (SUPRAVENTRICULAR TACHYCARDIA) (HCC): ICD-10-CM

## 2025-01-24 ENCOUNTER — OFFICE VISIT (OUTPATIENT)
Dept: INTERNAL MEDICINE | Age: 50
End: 2025-01-24
Payer: COMMERCIAL

## 2025-01-24 VITALS
BODY MASS INDEX: 27.12 KG/M2 | DIASTOLIC BLOOD PRESSURE: 92 MMHG | OXYGEN SATURATION: 99 % | WEIGHT: 172.8 LBS | HEART RATE: 85 BPM | TEMPERATURE: 97.2 F | SYSTOLIC BLOOD PRESSURE: 142 MMHG | HEIGHT: 67 IN

## 2025-01-24 DIAGNOSIS — Z12.12 ENCOUNTER FOR COLORECTAL CANCER SCREENING: ICD-10-CM

## 2025-01-24 DIAGNOSIS — E78.5 DYSLIPIDEMIA (HIGH LDL; LOW HDL): ICD-10-CM

## 2025-01-24 DIAGNOSIS — G43.809 OTHER MIGRAINE WITHOUT STATUS MIGRAINOSUS, NOT INTRACTABLE: ICD-10-CM

## 2025-01-24 DIAGNOSIS — F43.23 ADJUSTMENT DISORDER WITH MIXED ANXIETY AND DEPRESSED MOOD: ICD-10-CM

## 2025-01-24 DIAGNOSIS — E03.8 OTHER SPECIFIED HYPOTHYROIDISM: ICD-10-CM

## 2025-01-24 DIAGNOSIS — Z12.11 ENCOUNTER FOR COLORECTAL CANCER SCREENING: ICD-10-CM

## 2025-01-24 DIAGNOSIS — R73.03 PRE-DIABETES: Primary | ICD-10-CM

## 2025-01-24 PROBLEM — M25.859 HIP IMPINGEMENT SYNDROME: Status: RESOLVED | Noted: 2021-08-26 | Resolved: 2025-01-24

## 2025-01-24 PROBLEM — S73.102A: Status: RESOLVED | Noted: 2018-12-21 | Resolved: 2025-01-24

## 2025-01-24 PROBLEM — G43.909 MIGRAINE: Status: ACTIVE | Noted: 2025-01-24

## 2025-01-24 LAB
CHOLESTEROL, TOTAL: 115 MG/DL
CHOLESTEROL/HDL RATIO: 2.5
ESTIMATED AVERAGE GLUCOSE: NORMAL
GLUCOSE BLD-MCNC: 103 MG/DL
HBA1C MFR BLD: 5.7 %
HDLC SERPL-MCNC: 46 MG/DL (ref 35–70)
LDL CHOLESTEROL: 49
NONHDLC SERPL-MCNC: 69 MG/DL
TRIGL SERPL-MCNC: 118 MG/DL
VLDLC SERPL CALC-MCNC: NORMAL MG/DL

## 2025-01-24 PROCEDURE — 99214 OFFICE O/P EST MOD 30 MIN: CPT | Performed by: STUDENT IN AN ORGANIZED HEALTH CARE EDUCATION/TRAINING PROGRAM

## 2025-01-24 PROCEDURE — 3080F DIAST BP >= 90 MM HG: CPT | Performed by: STUDENT IN AN ORGANIZED HEALTH CARE EDUCATION/TRAINING PROGRAM

## 2025-01-24 PROCEDURE — 3077F SYST BP >= 140 MM HG: CPT | Performed by: STUDENT IN AN ORGANIZED HEALTH CARE EDUCATION/TRAINING PROGRAM

## 2025-01-24 RX ORDER — ATORVASTATIN CALCIUM 40 MG/1
40 TABLET, FILM COATED ORAL DAILY
Qty: 90 TABLET | Refills: 3 | Status: SHIPPED | OUTPATIENT
Start: 2025-01-24

## 2025-01-24 RX ORDER — SUMATRIPTAN 50 MG/1
TABLET, FILM COATED ORAL
Qty: 9 TABLET | Refills: 3 | Status: SHIPPED | OUTPATIENT
Start: 2025-01-24

## 2025-01-24 SDOH — HEALTH STABILITY: PHYSICAL HEALTH: ON AVERAGE, HOW MANY DAYS PER WEEK DO YOU ENGAGE IN MODERATE TO STRENUOUS EXERCISE (LIKE A BRISK WALK)?: 2 DAYS

## 2025-01-24 SDOH — ECONOMIC STABILITY: FOOD INSECURITY: WITHIN THE PAST 12 MONTHS, THE FOOD YOU BOUGHT JUST DIDN'T LAST AND YOU DIDN'T HAVE MONEY TO GET MORE.: NEVER TRUE

## 2025-01-24 SDOH — HEALTH STABILITY: PHYSICAL HEALTH: ON AVERAGE, HOW MANY MINUTES DO YOU ENGAGE IN EXERCISE AT THIS LEVEL?: 10 MIN

## 2025-01-24 SDOH — ECONOMIC STABILITY: FOOD INSECURITY: WITHIN THE PAST 12 MONTHS, YOU WORRIED THAT YOUR FOOD WOULD RUN OUT BEFORE YOU GOT MONEY TO BUY MORE.: NEVER TRUE

## 2025-01-24 ASSESSMENT — PATIENT HEALTH QUESTIONNAIRE - PHQ9
SUM OF ALL RESPONSES TO PHQ QUESTIONS 1-9: 0
1. LITTLE INTEREST OR PLEASURE IN DOING THINGS: NOT AT ALL
2. FEELING DOWN, DEPRESSED OR HOPELESS: NOT AT ALL
SUM OF ALL RESPONSES TO PHQ9 QUESTIONS 1 & 2: 0
SUM OF ALL RESPONSES TO PHQ QUESTIONS 1-9: 0

## 2025-01-24 NOTE — PROGRESS NOTES
[facility] Internal Medicine  [facility]   [address] [place] [id number]   P: [phone]      Margarita Wahl (:  1975) is a 49 y.o. female,Established patient, here for evaluation of the following chief complaint(s):  New Patient (Previous PCP Sharmin Collins ) and Annual Exam (Patient has her mammogram scheduled for 3/25 with [facility])      Assessment & Plan   ASSESSMENT/PLAN:  1. Pre-diabetes  Assessment & Plan:  Hgb A1C 5.7%  Discussed diet/lifestyle changes  Recommend exercise, weight loss, low carb diet  RTC 6 months for repeat A1C   2. Dyslipidemia (high LDL; low HDL)  -     atorvastatin (LIPITOR) 40 MG tablet; Take 1 tablet by mouth daily, Disp-90 tablet, R-3Normal  3. Adjustment disorder with mixed anxiety and depressed mood  4. Other migraine without status migrainosus, not intractable  Assessment & Plan:   Con Topamax, prn imitrex  Orders:  -     SUMAtriptan (IMITREX) 50 MG tablet; Take one tablet at onset of headache,  then 2nd dose in 2 hrs if needed, Disp-9 tablet, R-3Normal  5. Encounter for colorectal cancer screening  -     Winsome Camejo MD, Gastroenterology, [place]  6. Other specified hypothyroidism  Assessment & Plan:  Follows with Endocrinology       No results found for any visits on 25.     No follow-ups on file.         Subjective   SUBJECTIVE/OBJECTIVE:  HPI    Ms. Margarita Wahl is a pleasant 50 yo female with pmh of htn, SVT, PVC, hypothyroidism, osteoarthritis, hld, uterine leiomyoma who presents to establish care.     BP elevated today.     BP Readings from Last 6 Encounters:   25 (!) 142/92   24 104/72   24 102/74   24 118/80   24 124/82   24 122/80     She follows with Dr. Goldstein for PVC.   She follows with Endocrinology for hypothyroidism.      Review of Systems   Constitutional:         See HPI for ROS            Objective   Physical Exam  Constitutional:       Appearance: Normal appearance.   HENT:

## 2025-01-24 NOTE — ASSESSMENT & PLAN NOTE
Hgb A1C 5.7%  Discussed diet/lifestyle changes  Recommend exercise, weight loss, low carb diet  RTC 6 months for repeat A1C

## 2025-02-12 RX ORDER — LOSARTAN POTASSIUM 25 MG/1
25 TABLET ORAL DAILY
Qty: 90 TABLET | Refills: 3 | Status: SHIPPED | OUTPATIENT
Start: 2025-02-12

## 2025-02-12 NOTE — TELEPHONE ENCOUNTER
Requesting medication refill. Please approve or deny this request.    Rx requested:  Requested Prescriptions     Pending Prescriptions Disp Refills    losartan (COZAAR) 25 MG tablet [Pharmacy Med Name: LOSARTAN POTASSIUM 25 MG TAB] 90 tablet 3     Sig: TAKE 1 TABLET BY MOUTH EVERY DAY         Last Office Visit:   12/11/2024      Next Visit Date:  Future Appointments   Date Time Provider Department Center   5/23/2025  8:30 AM Mercado, Lenard S, PA [facility]   6/11/2025  9:00 AM Goldstein, Tam, MD [place] [facility]

## 2025-02-23 PROBLEM — Z12.12 ENCOUNTER FOR COLORECTAL CANCER SCREENING: Status: RESOLVED | Noted: 2025-01-24 | Resolved: 2025-02-23

## 2025-02-23 PROBLEM — Z12.11 ENCOUNTER FOR COLORECTAL CANCER SCREENING: Status: RESOLVED | Noted: 2025-01-24 | Resolved: 2025-02-23

## 2025-04-29 ENCOUNTER — PREP FOR PROCEDURE (OUTPATIENT)
Dept: GASTROENTEROLOGY | Age: 50
End: 2025-04-29

## 2025-04-29 RX ORDER — SODIUM CHLORIDE 0.9 % (FLUSH) 0.9 %
5-40 SYRINGE (ML) INJECTION EVERY 12 HOURS SCHEDULED
Status: CANCELLED | OUTPATIENT
Start: 2025-04-29

## 2025-04-29 RX ORDER — SODIUM CHLORIDE 0.9 % (FLUSH) 0.9 %
5-40 SYRINGE (ML) INJECTION PRN
Status: CANCELLED | OUTPATIENT
Start: 2025-04-29

## 2025-04-29 RX ORDER — SODIUM CHLORIDE 9 MG/ML
INJECTION, SOLUTION INTRAVENOUS CONTINUOUS
Status: CANCELLED | OUTPATIENT
Start: 2025-04-29

## 2025-04-29 RX ORDER — SODIUM CHLORIDE 9 MG/ML
INJECTION, SOLUTION INTRAVENOUS PRN
Status: CANCELLED | OUTPATIENT
Start: 2025-04-29

## 2025-05-05 ENCOUNTER — HOSPITAL ENCOUNTER (EMERGENCY)
Age: 50
Discharge: HOME | End: 2025-05-05
Attending: EMERGENCY MEDICINE
Payer: COMMERCIAL

## 2025-05-05 ENCOUNTER — APPOINTMENT (OUTPATIENT)
Dept: ULTRASOUND IMAGING | Age: 50
End: 2025-05-05
Payer: COMMERCIAL

## 2025-05-05 VITALS
HEIGHT: 66 IN | DIASTOLIC BLOOD PRESSURE: 70 MMHG | RESPIRATION RATE: 17 BRPM | WEIGHT: 160 LBS | SYSTOLIC BLOOD PRESSURE: 135 MMHG | TEMPERATURE: 97.8 F | HEART RATE: 79 BPM | BODY MASS INDEX: 25.71 KG/M2 | OXYGEN SATURATION: 100 %

## 2025-05-05 DIAGNOSIS — M79.662 PAIN OF LEFT CALF: Primary | ICD-10-CM

## 2025-05-05 PROCEDURE — 99284 EMERGENCY DEPT VISIT MOD MDM: CPT

## 2025-05-05 PROCEDURE — 93971 EXTREMITY STUDY: CPT

## 2025-05-05 PROCEDURE — 96372 THER/PROPH/DIAG INJ SC/IM: CPT

## 2025-05-05 PROCEDURE — 6360000002 HC RX W HCPCS

## 2025-05-05 RX ORDER — KETOROLAC TROMETHAMINE 30 MG/ML
30 INJECTION, SOLUTION INTRAMUSCULAR; INTRAVENOUS ONCE
Status: COMPLETED | OUTPATIENT
Start: 2025-05-05 | End: 2025-05-05

## 2025-05-05 RX ORDER — KETOROLAC TROMETHAMINE 10 MG/1
10 TABLET, FILM COATED ORAL EVERY 6 HOURS PRN
Qty: 20 TABLET | Refills: 0 | Status: SHIPPED | OUTPATIENT
Start: 2025-05-05

## 2025-05-05 RX ADMIN — KETOROLAC TROMETHAMINE 30 MG: 30 INJECTION, SOLUTION INTRAMUSCULAR at 14:33

## 2025-05-05 ASSESSMENT — ENCOUNTER SYMPTOMS
COUGH: 0
SORE THROAT: 0
ABDOMINAL PAIN: 0
DIARRHEA: 0
BACK PAIN: 0
VOMITING: 0
SHORTNESS OF BREATH: 0
NAUSEA: 0

## 2025-05-05 ASSESSMENT — PAIN DESCRIPTION - DESCRIPTORS: DESCRIPTORS: TIGHTNESS

## 2025-05-05 ASSESSMENT — PAIN DESCRIPTION - FREQUENCY: FREQUENCY: CONTINUOUS

## 2025-05-05 ASSESSMENT — PAIN DESCRIPTION - PAIN TYPE: TYPE: ACUTE PAIN

## 2025-05-05 ASSESSMENT — PAIN - FUNCTIONAL ASSESSMENT
PAIN_FUNCTIONAL_ASSESSMENT: ACTIVITIES ARE NOT PREVENTED
PAIN_FUNCTIONAL_ASSESSMENT: 0-10

## 2025-05-05 ASSESSMENT — PAIN SCALES - GENERAL: PAINLEVEL_OUTOF10: 9

## 2025-05-05 ASSESSMENT — PAIN DESCRIPTION - LOCATION: LOCATION: LEG

## 2025-05-05 ASSESSMENT — PAIN DESCRIPTION - ORIENTATION: ORIENTATION: LEFT

## 2025-05-05 ASSESSMENT — LIFESTYLE VARIABLES
HOW OFTEN DO YOU HAVE A DRINK CONTAINING ALCOHOL: NEVER
HOW MANY STANDARD DRINKS CONTAINING ALCOHOL DO YOU HAVE ON A TYPICAL DAY: PATIENT DOES NOT DRINK

## 2025-05-05 ASSESSMENT — PAIN DESCRIPTION - ONSET: ONSET: SUDDEN

## 2025-05-05 NOTE — DISCHARGE INSTRUCTIONS
Rest ice and elevate left leg.  Utilize prescriptions as ordered.  Suggest using compression stockings over-the-counter.  Follow-up with PCP.  Return to emergency department for any new or worsening symptoms.

## 2025-05-05 NOTE — ED PROVIDER NOTES
[facility] EMERGENCY DEPARTMENT  eMERGENCYdEPARTMENT eNCOUnter      Pt Name: Margarita Wahl  MRN: [id number]  Birthdate 1975of evaluation: 5/5/2025  Provider:JASSI Duval CNP    CHIEF COMPLAINT       Chief Complaint   Patient presents with    Leg Pain     Left lower, began yesterday         HISTORY OF PRESENT ILLNESS  (Location/Symptom, Timing/Onset, Context/Setting, Quality, Duration, Modifying Factors, Severity.)   Margarita Wahl is a 49 y.o. female hx of HTN, anxiety, who presents to the emergency department with leg pain.  Patient presents to the emergency department with complaints of left calf pain that she woke up with yesterday.  Denies any injury or trauma.  She complains of a 9 out of 10 pressure sensation to the left calf.  Pain is worse with weightbearing activity.  Denies any previous injury to the left leg.  Denies any long ear travel, long car travel, or previous DVT.  She is on birth control.  She did take Tylenol for her pain today.  Denies any history of PE.  Denies any chest pain, shortness of breath, abdominal pain, nausea, vomiting, diarrhea, fever, chills, headache or recent illness.    HPI    Nursing Notes were reviewed and I agree.    REVIEW OF SYSTEMS    (2-9 systems for level 4, 10 or more for level 5)     Review of Systems   Constitutional:  Negative for activity change, chills and fever.   HENT:  Negative for ear pain and sore throat.    Eyes:  Negative for visual disturbance.   Respiratory:  Negative for cough and shortness of breath.    Cardiovascular:  Negative for chest pain, palpitations and leg swelling.   Gastrointestinal:  Negative for abdominal pain, diarrhea, nausea and vomiting.   Genitourinary:  Negative for dysuria.   Musculoskeletal:  Positive for myalgias (left calf). Negative for back pain.   Skin:  Negative for rash.   Neurological:  Negative for dizziness and weakness.        as noted above the remainder of the review of systems was reviewed and negative.

## 2025-05-12 ENCOUNTER — TELEPHONE (OUTPATIENT)
Dept: INTERNAL MEDICINE | Age: 50
End: 2025-05-12

## 2025-05-12 NOTE — TELEPHONE ENCOUNTER
[facility] Clinical Staff  ECC Appointment Request    Patient needs appointment for ECC Appointment Type: Existing Condition Follow Up.    Patient Requested Dates(s):soonest available  Patient Requested Time:anytime  Provider Name:Mikayla Ty DO    Reason for Appointment Request: Established Patient - Available appointments did not meet patient need  --------------------------------------------------------------------------------------------------------------------------    Relationship to Patient: Self    Call Back Information: OK to leave message on voicemail  Preferred Call Back Number: Phone [phone]

## 2025-05-13 DIAGNOSIS — E03.8 OTHER SPECIFIED HYPOTHYROIDISM: ICD-10-CM

## 2025-05-13 LAB
ALBUMIN SERPL-MCNC: 3.8 G/DL (ref 3.5–4.6)
ALP SERPL-CCNC: 79 U/L (ref 40–130)
ALT SERPL-CCNC: 15 U/L (ref 0–33)
ANION GAP SERPL CALCULATED.3IONS-SCNC: 11 MEQ/L (ref 9–15)
AST SERPL-CCNC: 28 U/L (ref 0–35)
BILIRUB SERPL-MCNC: 1.2 MG/DL (ref 0.2–0.7)
BUN SERPL-MCNC: 15 MG/DL (ref 6–20)
CALCIUM SERPL-MCNC: 9.1 MG/DL (ref 8.5–9.9)
CHLORIDE SERPL-SCNC: 105 MEQ/L (ref 95–107)
CO2 SERPL-SCNC: 22 MEQ/L (ref 20–31)
CREAT SERPL-MCNC: 1.09 MG/DL (ref 0.5–0.9)
GLOBULIN SER CALC-MCNC: 3 G/DL (ref 2.3–3.5)
GLUCOSE SERPL-MCNC: 82 MG/DL (ref 70–99)
POTASSIUM SERPL-SCNC: 4.6 MEQ/L (ref 3.4–4.9)
PROT SERPL-MCNC: 6.8 G/DL (ref 6.3–8)
SODIUM SERPL-SCNC: 138 MEQ/L (ref 135–144)
T4 FREE SERPL-MCNC: 1.2 NG/DL (ref 0.84–1.68)
TSH REFLEX: 1.14 UIU/ML (ref 0.44–3.86)

## 2025-05-22 ENCOUNTER — OFFICE VISIT (OUTPATIENT)
Dept: FAMILY MEDICINE CLINIC | Age: 50
End: 2025-05-22
Payer: COMMERCIAL

## 2025-05-22 VITALS
WEIGHT: 163.2 LBS | TEMPERATURE: 97.5 F | HEIGHT: 66 IN | DIASTOLIC BLOOD PRESSURE: 82 MMHG | HEART RATE: 71 BPM | SYSTOLIC BLOOD PRESSURE: 126 MMHG | OXYGEN SATURATION: 99 % | BODY MASS INDEX: 26.23 KG/M2

## 2025-05-22 DIAGNOSIS — M25.572 ACUTE LEFT ANKLE PAIN: Primary | ICD-10-CM

## 2025-05-22 PROCEDURE — 99213 OFFICE O/P EST LOW 20 MIN: CPT | Performed by: NURSE PRACTITIONER

## 2025-05-22 PROCEDURE — 3074F SYST BP LT 130 MM HG: CPT | Performed by: NURSE PRACTITIONER

## 2025-05-22 PROCEDURE — 3079F DIAST BP 80-89 MM HG: CPT | Performed by: NURSE PRACTITIONER

## 2025-05-22 ASSESSMENT — ENCOUNTER SYMPTOMS
SHORTNESS OF BREATH: 0
CHEST TIGHTNESS: 0
COLOR CHANGE: 0
COUGH: 0
WHEEZING: 0

## 2025-05-22 NOTE — PROGRESS NOTES
Margarita Wahl (:  1975) is a 49 y.o. female, Established patient, here for evaluation of the following chief complaint(s):  Other (Left calf muscles hurt, went to ER and was told there is something wrong with calf.   X 2 weeks went to ER and they only checked to make sure no blood clots)      Vitals:    25 1022   BP: 126/82   Pulse: 71   Temp: 97.5 °F (36.4 °C)   SpO2: 99%       ASSESSMENT/PLAN:  1. Acute left ankle pain  -     [facility] - Arvind White PA-C, Orthopedics, Non-Surgical - [place]        -     ankle impingement causing calf pain????      Return in about 1 week (around 2025) for ortho.      SUBJECTIVE/OBJECTIVE:    Other  This is a new problem. Episode onset: x2 weeks Pain to left ankle then goes up to the left calf muscle. ER r/o DVT 25. The problem occurs constantly. The problem has been gradually worsening. Associated symptoms include arthralgias and myalgias. Pertinent negatives include no chest pain, chills, coughing, fatigue, fever, numbness, rash or weakness. The symptoms are aggravated by walking. She has tried NSAIDs for the symptoms. The treatment provided moderate relief.         Review of Systems   Constitutional:  Negative for chills, fatigue and fever.   Respiratory:  Negative for cough, chest tightness, shortness of breath and wheezing.    Cardiovascular:  Negative for chest pain and palpitations.   Genitourinary:  Negative for dysuria.   Musculoskeletal:  Positive for arthralgias and myalgias.   Skin:  Negative for color change, rash and wound.   Neurological:  Negative for weakness and numbness.         Physical Exam  Vitals reviewed.   Constitutional:       General: She is not in acute distress.     Appearance: Normal appearance.   Cardiovascular:      Rate and Rhythm: Normal rate and regular rhythm.   Pulmonary:      Effort: Pulmonary effort is normal. No respiratory distress.      Breath sounds: Normal air entry.   Musculoskeletal:      Left lower leg:

## 2025-05-23 ENCOUNTER — OFFICE VISIT (OUTPATIENT)
Dept: ENDOCRINOLOGY | Age: 50
End: 2025-05-23
Payer: COMMERCIAL

## 2025-05-23 VITALS
HEIGHT: 66 IN | WEIGHT: 163 LBS | SYSTOLIC BLOOD PRESSURE: 106 MMHG | DIASTOLIC BLOOD PRESSURE: 64 MMHG | BODY MASS INDEX: 26.2 KG/M2 | HEART RATE: 71 BPM

## 2025-05-23 DIAGNOSIS — E03.8 OTHER SPECIFIED HYPOTHYROIDISM: Primary | ICD-10-CM

## 2025-05-23 PROCEDURE — 3078F DIAST BP <80 MM HG: CPT | Performed by: PHYSICIAN ASSISTANT

## 2025-05-23 PROCEDURE — 3074F SYST BP LT 130 MM HG: CPT | Performed by: PHYSICIAN ASSISTANT

## 2025-05-23 PROCEDURE — 99214 OFFICE O/P EST MOD 30 MIN: CPT | Performed by: PHYSICIAN ASSISTANT

## 2025-05-23 RX ORDER — LEVOTHYROXINE SODIUM 50 UG/1
50 TABLET ORAL DAILY
Qty: 90 TABLET | Refills: 3 | Status: SHIPPED | OUTPATIENT
Start: 2025-05-23

## 2025-05-23 ASSESSMENT — ENCOUNTER SYMPTOMS
VOMITING: 0
RHINORRHEA: 0
DIARRHEA: 0
SORE THROAT: 0
NAUSEA: 0
WHEEZING: 0
SINUS PRESSURE: 0
SHORTNESS OF BREATH: 0
ABDOMINAL PAIN: 0
COUGH: 0

## 2025-06-11 ENCOUNTER — OFFICE VISIT (OUTPATIENT)
Age: 50
End: 2025-06-11
Payer: COMMERCIAL

## 2025-06-11 VITALS
DIASTOLIC BLOOD PRESSURE: 80 MMHG | RESPIRATION RATE: 16 BRPM | OXYGEN SATURATION: 100 % | SYSTOLIC BLOOD PRESSURE: 108 MMHG | BODY MASS INDEX: 25.02 KG/M2 | HEART RATE: 76 BPM | WEIGHT: 155 LBS

## 2025-06-11 DIAGNOSIS — I10 HTN (HYPERTENSION) WITH GOAL TO BE DETERMINED: Primary | ICD-10-CM

## 2025-06-11 DIAGNOSIS — R00.2 PALPITATIONS: ICD-10-CM

## 2025-06-11 PROCEDURE — 3074F SYST BP LT 130 MM HG: CPT | Performed by: INTERNAL MEDICINE

## 2025-06-11 PROCEDURE — 99214 OFFICE O/P EST MOD 30 MIN: CPT | Performed by: INTERNAL MEDICINE

## 2025-06-11 PROCEDURE — 3079F DIAST BP 80-89 MM HG: CPT | Performed by: INTERNAL MEDICINE

## 2025-06-11 ASSESSMENT — ENCOUNTER SYMPTOMS
WHEEZING: 0
NAUSEA: 0
EYE REDNESS: 0
VOMITING: 0
DIARRHEA: 0
CONSTIPATION: 0
CHEST TIGHTNESS: 0
RHINORRHEA: 0
SHORTNESS OF BREATH: 0
COLOR CHANGE: 0
COUGH: 0
ABDOMINAL PAIN: 0
APNEA: 0

## 2025-06-11 NOTE — PROGRESS NOTES
up and down frequently throughout the day with a cardiac issues      Patient Active Problem List   Diagnosis    Dyslipidemia (high LDL; low HDL)    Uterine leiomyoma    Unilateral primary osteoarthritis, left hip    HTN (hypertension) with goal to be determined    SVT (supraventricular tachycardia)    Other specified hypothyroidism    PVC (premature ventricular contraction)    Adjustment disorder with mixed anxiety and depressed mood    Pre-diabetes    Migraine       Past Surgical History:   Procedure Laterality Date    TUBAL LIGATION  2005    UPPER GASTROINTESTINAL ENDOSCOPY  12/01/2017    DR SINGH    UPPER GASTROINTESTINAL ENDOSCOPY  12/01/2017       Social History     Socioeconomic History    Marital status: Single   Tobacco Use    Smoking status: Never     Passive exposure: Never    Smokeless tobacco: Never   Vaping Use    Vaping status: Never Used   Substance and Sexual Activity    Alcohol use: No    Drug use: No    Sexual activity: Yes     Partners: Male     Social Drivers of Health     Financial Resource Strain: High Risk (5/16/2023)    Overall Financial Resource Strain (CARDIA)     Difficulty of Paying Living Expenses: Hard   Food Insecurity: No Food Insecurity (1/24/2025)    Hunger Vital Sign     Worried About Running Out of Food in the Last Year: Never true     Ran Out of Food in the Last Year: Never true   Transportation Needs: No Transportation Needs (1/24/2025)    PRAPARE - Transportation     Lack of Transportation (Medical): No     Lack of Transportation (Non-Medical): No   Physical Activity: Insufficiently Active (1/24/2025)    Exercise Vital Sign     Days of Exercise per Week: 2 days     Minutes of Exercise per Session: 10 min   Housing Stability: Low Risk  (1/24/2025)    Housing Stability Vital Sign     Unable to Pay for Housing in the Last Year: No     Number of Times Moved in the Last Year: 0     Homeless in the Last Year: No       Family History   Problem Relation Age of Onset    High Cholesterol

## 2025-07-31 PROBLEM — R79.89 ELEVATED SERUM CREATININE: Status: ACTIVE | Noted: 2025-07-31

## 2025-08-01 ENCOUNTER — OFFICE VISIT (OUTPATIENT)
Dept: INTERNAL MEDICINE | Age: 50
End: 2025-08-01
Payer: COMMERCIAL

## 2025-08-01 VITALS
BODY MASS INDEX: 25.18 KG/M2 | WEIGHT: 156 LBS | OXYGEN SATURATION: 99 % | TEMPERATURE: 97.2 F | SYSTOLIC BLOOD PRESSURE: 118 MMHG | HEART RATE: 72 BPM | DIASTOLIC BLOOD PRESSURE: 72 MMHG

## 2025-08-01 DIAGNOSIS — R79.89 ELEVATED SERUM CREATININE: Primary | ICD-10-CM

## 2025-08-01 DIAGNOSIS — R25.2 LEG CRAMPING: ICD-10-CM

## 2025-08-01 DIAGNOSIS — E78.5 DYSLIPIDEMIA (HIGH LDL; LOW HDL): ICD-10-CM

## 2025-08-01 DIAGNOSIS — R73.03 PRE-DIABETES: ICD-10-CM

## 2025-08-01 DIAGNOSIS — R51.9 HEADACHE BEHIND THE EYES: ICD-10-CM

## 2025-08-01 LAB
ANION GAP SERPL CALCULATED.3IONS-SCNC: 10 MEQ/L (ref 9–15)
BUN SERPL-MCNC: 11 MG/DL (ref 6–20)
CALCIUM SERPL-MCNC: 9.3 MG/DL (ref 8.5–9.9)
CHLORIDE SERPL-SCNC: 107 MEQ/L (ref 95–107)
CO2 SERPL-SCNC: 22 MEQ/L (ref 20–31)
CREAT SERPL-MCNC: 0.93 MG/DL (ref 0.5–0.9)
GLUCOSE SERPL-MCNC: 77 MG/DL (ref 70–99)
HBA1C MFR BLD: 5.5 %
MAGNESIUM SERPL-MCNC: 2 MG/DL (ref 1.7–2.4)
PHOSPHATE SERPL-MCNC: 3.2 MG/DL (ref 2.3–4.8)
POTASSIUM SERPL-SCNC: 4.8 MEQ/L (ref 3.4–4.9)
SODIUM SERPL-SCNC: 139 MEQ/L (ref 135–144)

## 2025-08-01 PROCEDURE — 83036 HEMOGLOBIN GLYCOSYLATED A1C: CPT | Performed by: STUDENT IN AN ORGANIZED HEALTH CARE EDUCATION/TRAINING PROGRAM

## 2025-08-01 PROCEDURE — 3074F SYST BP LT 130 MM HG: CPT | Performed by: STUDENT IN AN ORGANIZED HEALTH CARE EDUCATION/TRAINING PROGRAM

## 2025-08-01 PROCEDURE — 3078F DIAST BP <80 MM HG: CPT | Performed by: STUDENT IN AN ORGANIZED HEALTH CARE EDUCATION/TRAINING PROGRAM

## 2025-08-01 PROCEDURE — 99214 OFFICE O/P EST MOD 30 MIN: CPT | Performed by: STUDENT IN AN ORGANIZED HEALTH CARE EDUCATION/TRAINING PROGRAM

## 2025-08-01 RX ORDER — TOPIRAMATE 50 MG/1
50 TABLET, FILM COATED ORAL NIGHTLY
Qty: 90 TABLET | Refills: 2 | Status: SHIPPED | OUTPATIENT
Start: 2025-08-01

## 2025-08-01 RX ORDER — ATORVASTATIN CALCIUM 20 MG/1
20 TABLET, FILM COATED ORAL DAILY
Qty: 90 TABLET | Refills: 3 | Status: SHIPPED | OUTPATIENT
Start: 2025-08-01

## 2025-08-04 PROBLEM — R25.2 LEG CRAMPING: Status: ACTIVE | Noted: 2025-08-04

## 2025-08-04 PROBLEM — R51.9 HEADACHE BEHIND THE EYES: Status: ACTIVE | Noted: 2025-08-04

## 2025-08-07 RX ORDER — METOPROLOL TARTRATE 50 MG
50 TABLET ORAL 2 TIMES DAILY
Qty: 180 TABLET | Refills: 3 | Status: SHIPPED | OUTPATIENT
Start: 2025-08-07

## 2025-08-24 DIAGNOSIS — G43.809 OTHER MIGRAINE WITHOUT STATUS MIGRAINOSUS, NOT INTRACTABLE: ICD-10-CM

## 2025-08-25 RX ORDER — SUMATRIPTAN 50 MG/1
TABLET, FILM COATED ORAL
Qty: 9 TABLET | Refills: 3 | Status: SHIPPED | OUTPATIENT
Start: 2025-08-25